# Patient Record
Sex: MALE | Race: WHITE | NOT HISPANIC OR LATINO | Employment: FULL TIME | URBAN - METROPOLITAN AREA
[De-identification: names, ages, dates, MRNs, and addresses within clinical notes are randomized per-mention and may not be internally consistent; named-entity substitution may affect disease eponyms.]

---

## 2018-04-02 ENCOUNTER — TELEPHONE (OUTPATIENT)
Dept: GASTROENTEROLOGY | Facility: CLINIC | Age: 58
End: 2018-04-02

## 2021-11-23 ENCOUNTER — OFFICE VISIT (OUTPATIENT)
Dept: FAMILY MEDICINE CLINIC | Facility: CLINIC | Age: 61
End: 2021-11-23
Payer: COMMERCIAL

## 2021-11-23 VITALS
DIASTOLIC BLOOD PRESSURE: 78 MMHG | HEIGHT: 67 IN | SYSTOLIC BLOOD PRESSURE: 142 MMHG | BODY MASS INDEX: 27.78 KG/M2 | HEART RATE: 74 BPM | OXYGEN SATURATION: 98 % | WEIGHT: 177 LBS | TEMPERATURE: 98.2 F

## 2021-11-23 DIAGNOSIS — Z13.21 ENCOUNTER FOR VITAMIN DEFICIENCY SCREENING: ICD-10-CM

## 2021-11-23 DIAGNOSIS — Z11.59 NEED FOR HEPATITIS C SCREENING TEST: ICD-10-CM

## 2021-11-23 DIAGNOSIS — Z13.0 SCREENING FOR DEFICIENCY ANEMIA: ICD-10-CM

## 2021-11-23 DIAGNOSIS — Z13.1 ENCOUNTER FOR SCREENING FOR DIABETES MELLITUS: ICD-10-CM

## 2021-11-23 DIAGNOSIS — Z11.4 SCREENING FOR HIV (HUMAN IMMUNODEFICIENCY VIRUS): ICD-10-CM

## 2021-11-23 DIAGNOSIS — Z76.89 ESTABLISHING CARE WITH NEW DOCTOR, ENCOUNTER FOR: ICD-10-CM

## 2021-11-23 DIAGNOSIS — Z28.21 INFLUENZA VACCINE REFUSED: ICD-10-CM

## 2021-11-23 DIAGNOSIS — Z13.6 ENCOUNTER FOR SCREENING FOR CARDIOVASCULAR DISORDERS: ICD-10-CM

## 2021-11-23 DIAGNOSIS — E03.9 ACQUIRED HYPOTHYROIDISM: Primary | ICD-10-CM

## 2021-11-23 PROCEDURE — 99204 OFFICE O/P NEW MOD 45 MIN: CPT | Performed by: NURSE PRACTITIONER

## 2021-11-23 RX ORDER — LEVOTHYROXINE SODIUM 0.03 MG/1
25 TABLET ORAL DAILY
COMMUNITY

## 2021-11-23 RX ORDER — CLOBETASOL PROPIONATE 0.5 MG/G
OINTMENT TOPICAL
COMMUNITY
Start: 2021-11-05 | End: 2021-11-23 | Stop reason: ALTCHOICE

## 2021-11-24 ENCOUNTER — TELEPHONE (OUTPATIENT)
Dept: ADMINISTRATIVE | Facility: OTHER | Age: 61
End: 2021-11-24

## 2024-11-25 ENCOUNTER — OFFICE VISIT (OUTPATIENT)
Dept: FAMILY MEDICINE CLINIC | Facility: CLINIC | Age: 64
End: 2024-11-25
Payer: COMMERCIAL

## 2024-11-25 ENCOUNTER — APPOINTMENT (OUTPATIENT)
Age: 64
End: 2024-11-25
Payer: COMMERCIAL

## 2024-11-25 VITALS
TEMPERATURE: 98.9 F | HEIGHT: 71 IN | OXYGEN SATURATION: 98 % | HEART RATE: 102 BPM | SYSTOLIC BLOOD PRESSURE: 138 MMHG | DIASTOLIC BLOOD PRESSURE: 88 MMHG | WEIGHT: 173.4 LBS | BODY MASS INDEX: 24.27 KG/M2

## 2024-11-25 DIAGNOSIS — Z13.220 ENCOUNTER FOR SCREENING FOR LIPID DISORDER: ICD-10-CM

## 2024-11-25 DIAGNOSIS — Z00.00 ANNUAL PHYSICAL EXAM: ICD-10-CM

## 2024-11-25 DIAGNOSIS — E03.9 ACQUIRED HYPOTHYROIDISM: Primary | ICD-10-CM

## 2024-11-25 DIAGNOSIS — Z13.1 SCREENING FOR DIABETES MELLITUS: ICD-10-CM

## 2024-11-25 DIAGNOSIS — E03.9 ACQUIRED HYPOTHYROIDISM: ICD-10-CM

## 2024-11-25 DIAGNOSIS — Z12.5 SCREENING FOR PROSTATE CANCER: ICD-10-CM

## 2024-11-25 DIAGNOSIS — Z12.11 SCREENING FOR COLON CANCER: ICD-10-CM

## 2024-11-25 LAB
ALBUMIN SERPL BCG-MCNC: 4.8 G/DL (ref 3.5–5)
ALP SERPL-CCNC: 42 U/L (ref 34–104)
ALT SERPL W P-5'-P-CCNC: 31 U/L (ref 7–52)
ANION GAP SERPL CALCULATED.3IONS-SCNC: 8 MMOL/L (ref 4–13)
AST SERPL W P-5'-P-CCNC: 31 U/L (ref 13–39)
BILIRUB SERPL-MCNC: 0.72 MG/DL (ref 0.2–1)
BILIRUB UR QL STRIP: NEGATIVE
BUN SERPL-MCNC: 14 MG/DL (ref 5–25)
CALCIUM SERPL-MCNC: 9.7 MG/DL (ref 8.4–10.2)
CHLORIDE SERPL-SCNC: 99 MMOL/L (ref 96–108)
CHOLEST SERPL-MCNC: 180 MG/DL (ref ?–200)
CLARITY UR: CLEAR
CO2 SERPL-SCNC: 28 MMOL/L (ref 21–32)
COLOR UR: ABNORMAL
CREAT SERPL-MCNC: 0.83 MG/DL (ref 0.6–1.3)
ERYTHROCYTE [DISTWIDTH] IN BLOOD BY AUTOMATED COUNT: 13.1 % (ref 11.6–15.1)
GFR SERPL CREATININE-BSD FRML MDRD: 94 ML/MIN/1.73SQ M
GLUCOSE P FAST SERPL-MCNC: 76 MG/DL (ref 65–99)
GLUCOSE UR STRIP-MCNC: NEGATIVE MG/DL
HCT VFR BLD AUTO: 45.4 % (ref 36.5–49.3)
HDLC SERPL-MCNC: 74 MG/DL
HGB BLD-MCNC: 15.5 G/DL (ref 12–17)
HGB UR QL STRIP.AUTO: NEGATIVE
KETONES UR STRIP-MCNC: ABNORMAL MG/DL
LDLC SERPL CALC-MCNC: 96 MG/DL (ref 0–100)
LEUKOCYTE ESTERASE UR QL STRIP: NEGATIVE
MCH RBC QN AUTO: 31.9 PG (ref 26.8–34.3)
MCHC RBC AUTO-ENTMCNC: 34.1 G/DL (ref 31.4–37.4)
MCV RBC AUTO: 93 FL (ref 82–98)
NITRITE UR QL STRIP: NEGATIVE
PH UR STRIP.AUTO: 6 [PH]
PLATELET # BLD AUTO: 239 THOUSANDS/UL (ref 149–390)
PMV BLD AUTO: 10.8 FL (ref 8.9–12.7)
POTASSIUM SERPL-SCNC: 3.9 MMOL/L (ref 3.5–5.3)
PROT SERPL-MCNC: 8.2 G/DL (ref 6.4–8.4)
PROT UR STRIP-MCNC: NEGATIVE MG/DL
PSA SERPL-MCNC: 2.37 NG/ML (ref 0–4)
RBC # BLD AUTO: 4.86 MILLION/UL (ref 3.88–5.62)
SODIUM SERPL-SCNC: 135 MMOL/L (ref 135–147)
SP GR UR STRIP.AUTO: 1.02 (ref 1–1.03)
TRIGL SERPL-MCNC: 51 MG/DL (ref ?–150)
TSH SERPL DL<=0.05 MIU/L-ACNC: 44.23 UIU/ML (ref 0.45–4.5)
UROBILINOGEN UR STRIP-ACNC: <2 MG/DL
WBC # BLD AUTO: 6.74 THOUSAND/UL (ref 4.31–10.16)

## 2024-11-25 PROCEDURE — 84443 ASSAY THYROID STIM HORMONE: CPT

## 2024-11-25 PROCEDURE — 85027 COMPLETE CBC AUTOMATED: CPT

## 2024-11-25 PROCEDURE — 81003 URINALYSIS AUTO W/O SCOPE: CPT

## 2024-11-25 PROCEDURE — 80061 LIPID PANEL: CPT

## 2024-11-25 PROCEDURE — 36415 COLL VENOUS BLD VENIPUNCTURE: CPT

## 2024-11-25 PROCEDURE — 99213 OFFICE O/P EST LOW 20 MIN: CPT | Performed by: FAMILY MEDICINE

## 2024-11-25 PROCEDURE — 86376 MICROSOMAL ANTIBODY EACH: CPT

## 2024-11-25 PROCEDURE — G0103 PSA SCREENING: HCPCS

## 2024-11-25 PROCEDURE — 84439 ASSAY OF FREE THYROXINE: CPT

## 2024-11-25 PROCEDURE — 99396 PREV VISIT EST AGE 40-64: CPT | Performed by: FAMILY MEDICINE

## 2024-11-25 PROCEDURE — 80053 COMPREHEN METABOLIC PANEL: CPT

## 2024-11-25 NOTE — PROGRESS NOTES
Adult Annual Physical  Name: Huseyin Mckay      : 1960      MRN: [id number]  Encounter Provider: ZACH Spencer  Encounter Date: 2024   Encounter department: [facility] [address]    Assessment & Plan  Acquired hypothyroidism  Presently not treating, discussed previous TSH, denies any symptomology, would recommend reeval TSH and address after labs  Orders:    TSH + Free T4; Future    Anti-microsomal antibody; Future    Screening for prostate cancer    Orders:    PSA, Total Screen; Future    Encounter for screening for lipid disorder    Orders:    Lipid Panel with Direct LDL reflex; Future    Screening for diabetes mellitus    Orders:    Comprehensive metabolic panel; Future    Annual physical exam    Orders:    CBC; Future    UA w Reflex to Microscopic w Reflex to Culture; Future    Screening for colon cancer    Orders:    Ambulatory referral to Gastroenterology; Future    Immunizations and preventive care screenings were discussed with patient today. Appropriate education was printed on patient's after visit summary.        Counseling:  Alcohol/drug use: discussed moderation in alcohol intake, the recommendations for healthy alcohol use, and avoidance of illicit drug use.  Dental Health: discussed importance of regular tooth brushing, flossing, and dental visits.  Injury prevention: discussed safety/seat belts, safety helmets, smoke detectors, carbon monoxide detectors, and smoking near bedding or upholstery.  Exercise: the importance of regular exercise/physical activity was discussed. Recommend exercise 3-5 times per week for at least 30 minutes.       Depression Screening and Follow-up Plan: Patient was screened for depression during today's encounter. They screened negative with a PHQ-2 score of 0.    Tobacco Cessation Counseling: Tobacco cessation counseling was provided. The patient is sincerely urged to quit consumption of tobacco. He is ready to  "quit tobacco.         History of Present Illness     Adult Annual Physical:  Patient presents for annual physical.     Diet and Physical Activity:  - Diet/Nutrition: well balanced diet.  - Exercise: no formal exercise.    Depression Screening:  - PHQ-2 Score: 0    General Health:  - Sleep: sleeps well.  - Hearing: normal hearing bilateral ears.  - Vision: no vision problems and goes for regular eye exams.  - Dental: regular dental visits.     Health:    - Urinary symptoms: none.     Review of Systems   Constitutional:  Negative for appetite change, chills, fever and unexpected weight change.   HENT:  Negative for congestion, dental problem, ear pain, hearing loss, postnasal drip, rhinorrhea, sinus pressure, sinus pain, sneezing, sore throat, tinnitus and voice change.    Eyes:  Negative for visual disturbance.   Respiratory:  Negative for apnea, cough, chest tightness and shortness of breath.    Cardiovascular:  Negative for chest pain, palpitations and leg swelling.   Gastrointestinal:  Negative for abdominal pain, blood in stool, constipation, diarrhea, nausea and vomiting.   Endocrine: Negative for cold intolerance, heat intolerance, polydipsia, polyphagia and polyuria.   Genitourinary:  Negative for decreased urine volume, difficulty urinating, dysuria, frequency and hematuria.   Musculoskeletal:  Negative for arthralgias, back pain, gait problem, joint swelling and myalgias.   Skin:  Negative for color change, rash and wound.   Allergic/Immunologic: Negative for environmental allergies and food allergies.   Neurological:  Negative for dizziness, syncope, weakness, light-headedness, numbness and headaches.   Hematological:  Negative for adenopathy. Does not bruise/bleed easily.   Psychiatric/Behavioral:  Negative for sleep disturbance and suicidal ideas. The patient is not nervous/anxious.          Objective   /88   Pulse 102   Temp 98.9 °F (37.2 °C)   Ht 5' 11\" (1.803 m)   Wt 78.7 kg (173 lb 6.4 oz) "   SpO2 98%   BMI 24.18 kg/m²     Physical Exam  Constitutional:       General: He is not in acute distress.     Appearance: He is well-developed. He is not ill-appearing or toxic-appearing.   HENT:      Head: Normocephalic and atraumatic.   Eyes:      Conjunctiva/sclera: Conjunctivae normal.   Cardiovascular:      Rate and Rhythm: Normal rate and regular rhythm.      Heart sounds: Normal heart sounds.   Pulmonary:      Effort: Pulmonary effort is normal.      Breath sounds: Normal breath sounds.   Musculoskeletal:         General: Normal range of motion.      Cervical back: Normal range of motion and neck supple.   Skin:     General: Skin is warm and dry.      Findings: No lesion or rash.   Neurological:      Mental Status: He is alert and oriented to person, place, and time.      Deep Tendon Reflexes: Reflexes are normal and symmetric.   Psychiatric:         Behavior: Behavior normal.         Thought Content: Thought content normal.         Judgment: Judgment normal.

## 2024-11-25 NOTE — ASSESSMENT & PLAN NOTE
Presently not treating, discussed previous TSH, denies any symptomology, would recommend reeval TSH and address after labs  Orders:    TSH + Free T4; Future    Anti-microsomal antibody; Future

## 2024-11-26 LAB — T4 FREE SERPL-MCNC: 0.32 NG/DL (ref 0.61–1.12)

## 2024-11-27 LAB — THYROPEROXIDASE AB SERPL-ACNC: 112 IU/ML (ref 0–34)

## 2024-12-03 ENCOUNTER — TELEPHONE (OUTPATIENT)
Age: 64
End: 2024-12-03

## 2024-12-03 DIAGNOSIS — E03.9 ACQUIRED HYPOTHYROIDISM: Primary | ICD-10-CM

## 2024-12-03 RX ORDER — LEVOTHYROXINE SODIUM 25 UG/1
25 TABLET ORAL DAILY
Qty: 90 TABLET | Refills: 0 | Status: SHIPPED | OUTPATIENT
Start: 2024-12-03

## 2024-12-03 NOTE — TELEPHONE ENCOUNTER
Spoke with patient wife and she is aware, pharmacy was added to the chart as well to send the script

## 2024-12-03 NOTE — TELEPHONE ENCOUNTER
Please inform patient that thyroid level is extremely high.  It is recommended that he gets restarted on his levothyroxine.  Start him on levothyroxine 25 mcg, but there is no pharmacy in his chart.  Can this be updated so his medication could be started.  Once he started on levothyroxine, he is to repeat thyroid studies in 6 to 8 weeks.

## 2024-12-03 NOTE — TELEPHONE ENCOUNTER
Patients wife, Naida, called regarding blood work results.      Once read by the doctor, can someone give patient or Naida a call with the results.    Please advise and notify.    Thank you.    Huseyin- [phone]  Naida- [phone]

## 2024-12-04 ENCOUNTER — RESULTS FOLLOW-UP (OUTPATIENT)
Dept: FAMILY MEDICINE CLINIC | Facility: CLINIC | Age: 64
End: 2024-12-04

## 2025-02-13 ENCOUNTER — APPOINTMENT (OUTPATIENT)
Age: 65
End: 2025-02-13
Payer: COMMERCIAL

## 2025-02-13 ENCOUNTER — RESULTS FOLLOW-UP (OUTPATIENT)
Dept: FAMILY MEDICINE CLINIC | Facility: CLINIC | Age: 65
End: 2025-02-13

## 2025-02-13 DIAGNOSIS — E03.9 ACQUIRED HYPOTHYROIDISM: ICD-10-CM

## 2025-02-13 DIAGNOSIS — E03.9 ACQUIRED HYPOTHYROIDISM: Primary | ICD-10-CM

## 2025-02-13 LAB
T4 FREE SERPL-MCNC: 0.46 NG/DL (ref 0.61–1.12)
TSH SERPL DL<=0.05 MIU/L-ACNC: 45.53 UIU/ML (ref 0.45–4.5)

## 2025-02-13 PROCEDURE — 84443 ASSAY THYROID STIM HORMONE: CPT

## 2025-02-13 PROCEDURE — 84439 ASSAY OF FREE THYROXINE: CPT

## 2025-02-13 PROCEDURE — 36415 COLL VENOUS BLD VENIPUNCTURE: CPT

## 2025-02-13 RX ORDER — LEVOTHYROXINE SODIUM 50 UG/1
50 TABLET ORAL
Qty: 100 TABLET | Refills: 1 | Status: SHIPPED | OUTPATIENT
Start: 2025-02-13

## 2025-03-14 ENCOUNTER — PREP FOR PROCEDURE (OUTPATIENT)
Dept: GASTROENTEROLOGY | Facility: CLINIC | Age: 65
End: 2025-03-14

## 2025-03-14 DIAGNOSIS — Z86.0100 HISTORY OF COLON POLYPS: Primary | ICD-10-CM

## 2025-03-24 ENCOUNTER — OFFICE VISIT (OUTPATIENT)
Age: 65
End: 2025-03-24
Payer: COMMERCIAL

## 2025-03-24 VITALS — BODY MASS INDEX: 24.92 KG/M2 | HEIGHT: 71 IN | WEIGHT: 178 LBS | TEMPERATURE: 98.8 F

## 2025-03-24 DIAGNOSIS — L57.0 KERATOSIS, ACTINIC: Primary | ICD-10-CM

## 2025-03-24 DIAGNOSIS — L82.1 SEBORRHEIC KERATOSES: ICD-10-CM

## 2025-03-24 PROCEDURE — 17000 DESTRUCT PREMALG LESION: CPT | Performed by: DERMATOLOGY

## 2025-03-24 PROCEDURE — 99203 OFFICE O/P NEW LOW 30 MIN: CPT | Performed by: DERMATOLOGY

## 2025-03-24 NOTE — PROGRESS NOTES
"[facility] Dermatology Clinic Note     Patient Name: Huseyin Mckay  Encounter Date: 3/24/25     Have you been cared for by a [facility] Dermatologist in the last 3 years and, if so, which description applies to you?    NO.   I am considered a \"new\" patient and must complete all patient intake questions. I am MALE/not capable of bearing children.    REVIEW OF SYSTEMS:  Have you recently had or currently have any of the following? Recent fever or chills? No  Any non-healing wound? No   PAST MEDICAL HISTORY:  Have you personally ever had or currently have any of the following?  If \"YES,\" then please provide more detail. Skin cancer (such as Melanoma, Basal Cell Carcinoma, Squamous Cell Carcinoma?  No  Tuberculosis, HIV/AIDS, Hepatitis B or C: No  Radiation Treatment No   HISTORY OF IMMUNOSUPPRESSION:   Do you have a history of any of the following:  Systemic Immunosuppression such as Diabetes, Biologic or Immunotherapy, Chemotherapy, Organ Transplantation, Bone Marrow Transplantation or Prednisone?  No     Answering \"YES\" requires the addition of the dotphrase \"IMMUNOSUPPRESSED\" as the first diagnosis of the patient's visit.   FAMILY HISTORY:  Any \"first degree relatives\" (parent, brother, sister, or child) with the following?    Skin Cancer, Pancreatic or Other Cancer? YES, Sister skin cancer type unknown to patient   PATIENT EXPERIENCE:    Do you want the Dermatologist to perform a COMPLETE skin exam today including a clinical examination under the \"bra and underwear\" areas?  NO patient decline FBSE  If necessary, do we have your permission to call and leave a detailed message on your Preferred Phone number that includes your specific medical information?  NO      No Known Allergies   Current Outpatient Medications:   •  levothyroxine 50 mcg tablet, Take 1 tablet (50 mcg total) by mouth daily in the early morning, Disp: 100 tablet, Rfl: 1          Whom besides the patient is providing clinical information about " "today's encounter?   NO ADDITIONAL HISTORIAN (patient alone provided history)    Physical Exam and Assessment/Plan by Diagnosis:    Patient is here for 2 SOC on the Right temple and back. Patient stated none of the SOC has had any symptoms. Sister with history of skin cancer.    ACTINIC KERATOSIS    Physical Exam:  Anatomic Location Affected:  right temple  Morphological Description:  Scaly pink papules  Pertinent Positives:  Pertinent Negatives:      Assessment and Plan:  Based on a thorough discussion of this condition and the management approach to it (including a comprehensive discussion of the known risks, side effects and potential benefits of treatment), the patient (family) agrees to implement the following specific plan:  When outside we recommend using a wide brim hat, sunglasses, long sleeve and pants, sunscreen with SPF 30+ with reapplication every 2 hours, or SPF specific clothing   liquid nitrogen to treat areas. Consent obtained. Expect area to blister, crust, and then fall off within 2 weeks. Please use vaseline.                                         PROCEDURE:  DESTRUCTION OF PRE-MALIGNANT LESIONS  After a thorough discussion of treatment options and risk/benefits/alternatives (including but not limited to local pain, scarring, dyspigmentation, blistering, and possible superinfection), verbal and written consent were obtained and the aforementioned lesions were treated on with cryotherapy using liquid nitrogen x 1 cycle for 5-10 seconds.    TOTAL NUMBER of 1 pre-malignant lesions were treated today on the ANATOMIC LOCATION: Right temple.     The patient tolerated the procedure well, and after-care instructions were provided.    SEBORRHEIC KERATOSIS; NON-INFLAMED    Physical Exam:  Anatomic Location Affected:  right lower back  Morphological Description:  Flat and raised, waxy, smooth to warty textured, yellow to brownish-grey to dark brown to blackish, discrete, \"stuck-on\" appearing " "papules.  Pertinent Positives:  Pertinent Negatives:    Additional History of Present Condition:  Patient reports new bumps on the skin.  Denies itch, burn, pain, bleeding or ulceration.  Present constantly; nothing seems to make it worse or better.  No prior treatment.      Assessment and Plan:  Based on a thorough discussion of this condition and the management approach to it (including a comprehensive discussion of the known risks, side effects and potential benefits of treatment), the patient (family) agrees to implement the following specific plan:  Reassured benign    Seborrheic Keratosis  A seborrheic keratosis is a harmless warty spot that appears during adult life as a common sign of skin aging.  Seborrheic keratoses can arise on any area of skin, covered or uncovered, with the usual exception of the palms and soles. They do not arise from mucous membranes. Seborrheic keratoses can have highly variable appearance.      Seborrheic keratoses are extremely common. It has been estimated that over 90% of adults over the age of 60 years have one or more of them. They occur in males and females of all races, typically beginning to erupt in the 30s or 40s. They are uncommon under the age of 20 years.  The precise cause of seborrhoeic keratoses is not known.  Seborrhoeic keratoses are considered degenerative in nature. As time goes by, seborrheic keratoses tend to become more numerous. Some people inherit a tendency to develop a very large number of them; some people may have hundreds of them.    The name \"seborrheic keratosis\" is misleading, because these lesions are not limited to a seborrhoeic distribution (scalp, mid-face, chest, upper back), nor are they formed from sebaceous glands, nor are they associated with sebum -- which is greasy.  Seborrheic keratosis may also be called \"SK,\" \"Seb K,\" \"basal cell papilloma,\" \"senile wart,\" or \"barnacle.\"      Researchers have noted:  Eruptive seborrhoeic keratoses can " "follow sunburn or dermatitis  Skin friction may be the reason they appear in body folds  Viral cause (e.g., human papillomavirus) seems unlikely  Stable and clonal mutations or activation of FRFR3, PIK3CA, KD, AKT1 and EGFR genes are found in seborrhoeic keratoses  Seborrhoeic keratosis can arise from solar lentigo  FRFR3 mutations also arise in solar lentigines. These mutations are associated with increased age and location on the head and neck, suggesting a role of ultraviolet radiation in these lesions  Seborrheic keratoses do not harbour tumour suppressor gene mutations  Epidermal growth factor receptor inhibitors, which are used to treat some cancers, often result in an increase in verrucal (warty) keratoses.    There is no easy way to remove multiple lesions on a single occasion.  Unless a specific lesion is \"inflamed\" and is causing pain or stinging/burning or is bleeding, most insurance companies do not authorize treatment.       Scribe Attestation    I,:  Aileen Pagan MA am acting as a scribe while in the presence of the attending physician.:       I,:  Leeann Escamilla MD personally performed the services described in this documentation    as scribed in my presence.:          "

## 2025-04-02 ENCOUNTER — HOSPITAL ENCOUNTER (OUTPATIENT)
Dept: GASTROENTEROLOGY | Facility: HOSPITAL | Age: 65
Setting detail: OUTPATIENT SURGERY
Discharge: HOME | End: 2025-04-02
Attending: INTERNAL MEDICINE
Payer: COMMERCIAL

## 2025-04-02 ENCOUNTER — ANESTHESIA EVENT (OUTPATIENT)
Dept: GASTROENTEROLOGY | Facility: HOSPITAL | Age: 65
End: 2025-04-02
Payer: COMMERCIAL

## 2025-04-02 ENCOUNTER — ANESTHESIA (OUTPATIENT)
Dept: GASTROENTEROLOGY | Facility: HOSPITAL | Age: 65
End: 2025-04-02
Payer: COMMERCIAL

## 2025-04-02 VITALS
OXYGEN SATURATION: 98 % | WEIGHT: 166.23 LBS | HEIGHT: 71 IN | HEART RATE: 67 BPM | RESPIRATION RATE: 18 BRPM | BODY MASS INDEX: 23.27 KG/M2 | SYSTOLIC BLOOD PRESSURE: 160 MMHG | TEMPERATURE: 97.5 F | DIASTOLIC BLOOD PRESSURE: 96 MMHG

## 2025-04-02 DIAGNOSIS — Z12.11 COLON CANCER SCREENING: ICD-10-CM

## 2025-04-02 DIAGNOSIS — Z86.0100 HISTORY OF COLON POLYPS: ICD-10-CM

## 2025-04-02 PROBLEM — F17.200 CURRENT SMOKER ON SOME DAYS: Status: ACTIVE | Noted: 2025-04-02

## 2025-04-02 PROCEDURE — 45385 COLONOSCOPY W/LESION REMOVAL: CPT | Performed by: INTERNAL MEDICINE

## 2025-04-02 PROCEDURE — 88305 TISSUE EXAM BY PATHOLOGIST: CPT | Performed by: PATHOLOGY

## 2025-04-02 RX ORDER — LIDOCAINE HYDROCHLORIDE 20 MG/ML
INJECTION, SOLUTION EPIDURAL; INFILTRATION; INTRACAUDAL; PERINEURAL AS NEEDED
Status: DISCONTINUED | OUTPATIENT
Start: 2025-04-02 | End: 2025-04-02

## 2025-04-02 RX ORDER — PROPOFOL 10 MG/ML
INJECTION, EMULSION INTRAVENOUS AS NEEDED
Status: DISCONTINUED | OUTPATIENT
Start: 2025-04-02 | End: 2025-04-02

## 2025-04-02 RX ADMIN — PROPOFOL 30 MG: 10 INJECTION, EMULSION INTRAVENOUS at 08:35

## 2025-04-02 RX ADMIN — PROPOFOL 50 MG: 10 INJECTION, EMULSION INTRAVENOUS at 08:28

## 2025-04-02 RX ADMIN — LIDOCAINE HYDROCHLORIDE 100 MG: 20 INJECTION, SOLUTION EPIDURAL; INFILTRATION; INTRACAUDAL; PERINEURAL at 08:23

## 2025-04-02 RX ADMIN — PROPOFOL 140 MG: 10 INJECTION, EMULSION INTRAVENOUS at 08:23

## 2025-04-02 RX ADMIN — PROPOFOL 50 MG: 10 INJECTION, EMULSION INTRAVENOUS at 08:25

## 2025-04-02 RX ADMIN — PROPOFOL 30 MG: 10 INJECTION, EMULSION INTRAVENOUS at 08:31

## 2025-04-02 NOTE — ANESTHESIA PREPROCEDURE EVALUATION
"Procedure:  COLONOSCOPY    Relevant Problems   ENDO   (+) Acquired hypothyroidism      Behavioral Health   (+) Current smoker on some days        Physical Exam    Airway    Mallampati score: II  TM Distance: >3 FB  Neck ROM: full     Dental   Comment: Patient elects not to remove upper dentures, counseled on reasons to remove     Cardiovascular      Pulmonary      Other Findings        Anesthesia Plan  ASA Score- 2     Anesthesia Type- IV sedation with anesthesia with ASA Monitors.         Additional Monitors:     Airway Plan:     Comment: Recent labs personally reviewed:  Lab Results       Component                Value               Date                       WBC                      6.74                11/25/2024                 HGB                      15.5                11/25/2024                 PLT                      239                 11/25/2024            Lab Results       Component                Value               Date                       K                        3.9                 11/25/2024                 BUN                      14                  11/25/2024                 CREATININE               0.83                11/25/2024            No results found for: \"PTT\"   No results found for: \"INR\"    Blood type     Patient was consented for sedation with IV anesthetic. Discussed that we will maintain spontaneous respirations and utilize supplemental O2. I discussed the risks of aspiration, hypoxia, laryngospasm and bronchospasm. I discussed the scenarios related to conversion to general anesthetic. All questions answered.     I, No Crow MD, have personally seen and evaluated the patient prior to anesthetic care.  I have reviewed the pre-anesthetic record, medical history, allergies, medications and any other medical records if appropriate to the anesthetic care.  If a CRNA is involved in the case, I have reviewed the CRNA assessment, if present, and agree. Patient consented for IV " Sedation, general anesthesia as back up. Discussed risks of aspiration, IV infiltration, indications for conversion to general anesthesia. All questions and concerns addressed.   .       Plan Factors-Exercise tolerance (METS): >4 METS.    Chart reviewed.   Existing labs reviewed. Patient summary reviewed.    Patient is a current smoker.  Patient instructed to abstain from smoking on day of procedure. Patient did not smoke on day of surgery.    Obstructive sleep apnea risk education given perioperatively.        Induction- intravenous.    Postoperative Plan-         Informed Consent- Anesthetic plan and risks discussed with patient.  I personally reviewed this patient with the CRNA. Discussed and agreed on the Anesthesia Plan with the CRNA..      NPO Status:  No vitals data found for the desired time range.

## 2025-04-02 NOTE — H&P
"History and Physical -  [facility]  Huseyin Mckay 65 y.o. male MRN: [id number]                  HPI: Huseyin Mckay is a 65 y.o. year old male who presents for   Colonoscopy for history of colon polyp    REVIEW OF SYSTEMS: Per the HPI, and otherwise unremarkable.    Historical Information   Past Medical History:   Diagnosis Date    Disease of thyroid gland     Hernia, inguinal     Torn meniscus     rt knee x 3     Past Surgical History:   Procedure Laterality Date    HERNIA REPAIR      KNEE SURGERY       Social History   Social History     Substance and Sexual Activity   Alcohol Use Yes    Comment: on weekends     Social History     Substance and Sexual Activity   Drug Use Never     Social History     Tobacco Use   Smoking Status Some Days    Types: Cigars   Smokeless Tobacco Never     Family History   Problem Relation Age of Onset    No Known Problems Mother     Hypertension Father     Heart disease Father     Diabetes Brother     No Known Problems Son     No Known Problems Daughter     Diabetes Brother        Meds/Allergies     Not in a hospital admission.    No Known Allergies    Objective     Blood pressure (!) 178/99, pulse 83, temperature (!) 97.1 °F (36.2 °C), temperature source Temporal, resp. rate 15, height 5' 11\" (1.803 m), weight 75.4 kg (166 lb 3.6 oz), SpO2 99%.      PHYSICAL EXAM    BP (!) 178/99   Pulse 83   Temp (!) 97.1 °F (36.2 °C) (Temporal)   Resp 15   Ht 5' 11\" (1.803 m)   Wt 75.4 kg (166 lb 3.6 oz)   SpO2 99%   BMI 23.18 kg/m²       Gen: NAD  CV: RRR  CHEST: Clear  ABD: soft, NT/ND  EXT: no edema      ASSESSMENT/PLAN:  This is a 65 y.o. year old male here for coloolnoscopy, and he is stable and optimized for his procedure.        "

## 2025-04-02 NOTE — ANESTHESIA POSTPROCEDURE EVALUATION
Post-Op Assessment Note    CV Status:  Stable    Pain management: adequate       Mental Status:  Alert and awake   Hydration Status:  Euvolemic   PONV Controlled:  Controlled   Airway Patency:  Patent  Two or more mitigation strategies used for obstructive sleep apnea   Post Op Vitals Reviewed: Yes    No anethesia notable event occurred.    Staff: CRNA, Anesthesiologist           Last Filed PACU Vitals:  Vitals Value Taken Time   Temp     Pulse     BP     Resp     SpO2         Modified May:     Vitals Value Taken Time   Activity 2 04/02/25 0839   Respiration 2 04/02/25 0839   Circulation 2 04/02/25 0839   Consciousness 2 04/02/25 0839   Oxygen Saturation 2 04/02/25 0839     Modified May Score: 10

## 2025-04-08 ENCOUNTER — RESULTS FOLLOW-UP (OUTPATIENT)
Dept: GASTROENTEROLOGY | Facility: CLINIC | Age: 65
End: 2025-04-08

## 2025-04-08 NOTE — TELEPHONE ENCOUNTER
----- Message from Man Covarrubias MD sent at 4/8/2025  7:35 AM EDT -----  Please tell Huseyin the polyps were precancerous and will be due for colonoscopy in 3 years and we will call him then

## 2025-04-14 ENCOUNTER — TELEPHONE (OUTPATIENT)
Age: 65
End: 2025-04-14

## 2025-05-17 ENCOUNTER — OFFICE VISIT (OUTPATIENT)
Age: 65
End: 2025-05-17
Payer: COMMERCIAL

## 2025-05-17 ENCOUNTER — APPOINTMENT (OUTPATIENT)
Age: 65
End: 2025-05-17
Attending: PHYSICIAN ASSISTANT
Payer: COMMERCIAL

## 2025-05-17 VITALS
TEMPERATURE: 99 F | HEART RATE: 129 BPM | DIASTOLIC BLOOD PRESSURE: 75 MMHG | BODY MASS INDEX: 24.21 KG/M2 | OXYGEN SATURATION: 96 % | RESPIRATION RATE: 18 BRPM | SYSTOLIC BLOOD PRESSURE: 151 MMHG | WEIGHT: 173.6 LBS

## 2025-05-17 DIAGNOSIS — R09.89 CHEST CONGESTION: ICD-10-CM

## 2025-05-17 DIAGNOSIS — R91.8 INFILTRATE OF LUNG PRESENT ON CHEST X-RAY: Primary | ICD-10-CM

## 2025-05-17 DIAGNOSIS — R53.83 OTHER FATIGUE: ICD-10-CM

## 2025-05-17 PROCEDURE — G0382 LEV 3 HOSP TYPE B ED VISIT: HCPCS | Performed by: PHYSICIAN ASSISTANT

## 2025-05-17 PROCEDURE — 71046 X-RAY EXAM CHEST 2 VIEWS: CPT

## 2025-05-17 RX ORDER — BENZONATATE 200 MG/1
200 CAPSULE ORAL 3 TIMES DAILY PRN
Qty: 20 CAPSULE | Refills: 0 | Status: SHIPPED | OUTPATIENT
Start: 2025-05-17

## 2025-05-17 RX ORDER — AZITHROMYCIN 250 MG/1
TABLET, FILM COATED ORAL
Qty: 6 TABLET | Refills: 0 | Status: SHIPPED | OUTPATIENT
Start: 2025-05-17 | End: 2025-05-21

## 2025-05-17 NOTE — PROGRESS NOTES
[facility]        NAME: Huseyin Mckay is a 65 y.o. male  : 1960    MRN: [id number]  DATE: May 17, 2025  TIME: 9:15 AM    Assessment and Plan   Infiltrate of lung present on chest x-ray [R91.8]  1. Infiltrate of lung present on chest x-ray  azithromycin (ZITHROMAX) 250 mg tablet    benzonatate (TESSALON) 200 MG capsule    dextromethorphan-guaifenesin (MUCINEX DM)  MG per 12 hr tablet      2. Other fatigue  XR chest pa and lateral      3. Chest congestion  XR chest pa and lateral    benzonatate (TESSALON) 200 MG capsule    dextromethorphan-guaifenesin (MUCINEX DM)  MG per 12 hr tablet            Patient Instructions     Preliminary x-rays of your chest reveals infiltrates.  Official report is pending.    Azithromycin as directed  Tessalon Perles every 8 hours as needed for cough  Mucinex DM 1 tablet twice daily for cough  Increase fluid intake and remain well-hydrated    Follow up with PCP in 3-5 days.  Proceed to  ER if symptoms worsen.    If tests are performed, our office will contact you with results only if changes need to made to the care plan discussed with you at the visit. You can review your full results on [facility].    Chief Complaint     Chief Complaint   Patient presents with    Headache     Reports headache x 3 days. Cough productive clear. Chills and sweats . 2 negtive home coivd test taken last one yesterday. Denies Sob taking otc tylenol and nasal spray which is not helping with pain.     Cough         History of Present Illness       Reports headache x 3 days. Cough productive clear. Chills and sweats . 2 negtive home coivd test taken last one yesterday. Denies Sob taking otc tylenol and nasal spray which is not helping with pain.            Review of Systems   Review of Systems   Constitutional:  Positive for chills, diaphoresis and fever. Negative for appetite change and fatigue.   HENT:  Positive for congestion, rhinorrhea, sinus pressure and sinus pain.  Negative for drooling, ear pain, sore throat and trouble swallowing.    Respiratory:  Positive for cough.    Gastrointestinal:  Negative for abdominal pain, diarrhea, nausea and vomiting.   Skin:  Negative for rash.   Neurological:  Positive for headaches. Negative for dizziness, seizures, weakness and light-headedness.         Current Medications     Current Medications[1]    Current Allergies     Allergies as of 05/17/2025    (No Known Allergies)            The following portions of the patient's history were reviewed and updated as appropriate: allergies, current medications, past family history, past medical history, past social history, past surgical history and problem list.     Past Medical History:   Diagnosis Date    Disease of thyroid gland     Hernia, inguinal     Torn meniscus     rt knee x 3       Past Surgical History:   Procedure Laterality Date    HERNIA REPAIR      KNEE SURGERY         Family History   Problem Relation Age of Onset    No Known Problems Mother     Hypertension Father     Heart disease Father     Diabetes Brother     No Known Problems Son     No Known Problems Daughter     Diabetes Brother          Medications have been verified.        Objective   /75 (Patient Position: Sitting, Cuff Size: Adult)   Pulse (!) 129   Temp 99 °F (37.2 °C) (Temporal)   Resp 18   Wt 78.7 kg (173 lb 9.6 oz)   SpO2 96%   BMI 24.21 kg/m²        Physical Exam     Physical Exam  Vitals and nursing note reviewed.   Constitutional:       General: He is not in acute distress.     Appearance: He is normal weight. He is ill-appearing. He is not toxic-appearing or diaphoretic.   HENT:      Right Ear: Tympanic membrane, ear canal and external ear normal.      Left Ear: Tympanic membrane, ear canal and external ear normal.      Nose: Congestion and rhinorrhea present.      Mouth/Throat:      Mouth: Mucous membranes are moist.      Pharynx: Posterior oropharyngeal erythema (Oropharyngeal hyperemia with clear  "postnasal drip.) present. No oropharyngeal exudate.     Eyes:      General: No scleral icterus.     Extraocular Movements: Extraocular movements intact.      Conjunctiva/sclera: Conjunctivae normal.      Pupils: Pupils are equal, round, and reactive to light.       Cardiovascular:      Rate and Rhythm: Normal rate and regular rhythm.      Pulses: Normal pulses.      Heart sounds: Normal heart sounds.   Pulmonary:      Effort: Pulmonary effort is normal. No respiratory distress.      Breath sounds: No stridor. Rhonchi (Right posterior lower lobe.) present. No wheezing or rales.   Chest:      Chest wall: No tenderness.     Musculoskeletal:         General: Normal range of motion.      Cervical back: Normal range of motion and neck supple. No tenderness.   Lymphadenopathy:      Cervical: No cervical adenopathy.     Skin:     General: Skin is warm and dry.      Findings: No rash.     Neurological:      General: No focal deficit present.      Mental Status: He is alert and oriented to person, place, and time.      Coordination: Coordination normal.      Gait: Gait normal.     Psychiatric:         Mood and Affect: Mood normal.         Behavior: Behavior normal.         Thought Content: Thought content normal.         Judgment: Judgment normal.                        [1]   Current Outpatient Medications:     azithromycin (ZITHROMAX) 250 mg tablet, Take 2 tablets today then 1 tablet daily x 4 days, Disp: 6 tablet, Rfl: 0    benzonatate (TESSALON) 200 MG capsule, Take 1 capsule (200 mg total) by mouth 3 (three) times a day as needed for cough, Disp: 20 capsule, Rfl: 0    dextromethorphan-guaifenesin (MUCINEX DM)  MG per 12 hr tablet, Take 1 tablet by mouth every 12 (twelve) hours, Disp: 20 tablet, Rfl: 0    levothyroxine 50 mcg tablet, Take 1 tablet (50 mcg total) by mouth daily in the early morning, Disp: 100 tablet, Rfl: 1    UNKNOWN TO PATIENT, Generic allergy medicine \"without decongestant\", Disp: , Rfl:     "

## 2025-05-17 NOTE — PATIENT INSTRUCTIONS
Patient Education    Preliminary x-rays of your chest reveals infiltrates.  Official report is pending.    Azithromycin as directed  Tessalon Perles every 8 hours as needed for cough  Mucinex DM 1 tablet twice daily for cough  Increase fluid intake and remain well-hydrated    Follow up with PCP in 3-5 days.  Proceed to  ER if symptoms worsen.    If tests are performed, our office will contact you with results only if changes need to made to the care plan discussed with you at the visit. You can review your full results on [facility].     Pneumonia in adults   The Basics   Written by the doctors and editors at [facility]   What is pneumonia? -- Pneumonia is an infection of the lungs that causes coughing, fever, and trouble breathing (figure 1). It is a serious illness, especially in young children, people older than 65, and people with other health problems. Pneumonia can be caused by bacteria, viruses, and other germs.  What are the symptoms of pneumonia? -- Common symptoms include:   Cough   Fever (temperature higher than 100.4°F or 38°C)   Trouble breathing   Pain when taking a deep breath   Fast heartbeat   Shaking chills  When people with pneumonia cough, they often cough up phlegm or mucus.  Should I see a doctor or nurse? -- Yes, if you think that you might have pneumonia, see a doctor or nurse as soon as possible. Pneumonia can be mild. But it can also be very serious, especially if you do not get it treated quickly. See your doctor or nurse right away if:   Your cough keeps getting worse.   You start having trouble breathing when doing everyday tasks or when resting.   You have chest pain when you breathe.   You feel suddenly worse after getting better from a cold or the flu.   You have a weakened immune system, for example because you have an HIV infection, had an organ transplant or stem cell (bone marrow) transplant, or take medicines that suppress the immune system.   You already have a serious  "lung disease, such as chronic obstructive pulmonary disease or emphysema.   You are 65 years of age or older.  If your doctor or nurse thinks that you might have pneumonia, they will probably take an X-ray of your chest. Taking a chest X-ray is the best way to tell if you have pneumonia.  How is pneumonia treated? -- It depends on the cause:   Pneumonia that is caused by bacteria is treated with antibiotics. These medicines kill the germs that cause pneumonia. Most people can take antibiotic pills at home, but some people need to be treated in the hospital. Take all of your antibiotics, even if you feel better before you finish them.   Pneumonia from some viruses, like those that cause the flu or COVID-19, is treated with an \"antiviral\" medicine. For other types of viral pneumonia, there is no specific treatment.  How soon will I feel better? -- You should start to feel better 3 to 5 days after you start taking antibiotics. Most people can go back to their normal routine within a week of starting treatment. Even so, you might feel tired or have a cough for a month or longer after you get treated. Although this cough can take a while to go away, it is usually milder than when you first got sick.  How should I take care of myself until I recover? -- Get lots of rest, and drink lots of fluids.  If you don't need to stay in the hospital, your doctor or nurse might want to see you or talk to you a few days after you begin treatment. This is to make sure that your pneumonia is getting better. They might also want to see you after you get better to make sure that everything is back to normal.  If your symptoms do not improve or get worse after starting treatment, tell your doctor or nurse.  What can I do to keep from getting pneumonia again? -- Wash your hands often with soap and water (figure 2). This will help protect you from germs and help prevent spreading illness.  There is also a vaccine that protects against the " most common type of bacterial pneumonia. But the pneumonia vaccine is not recommended for everyone. Ask your doctor if you should have it. You should get the flu and COVID-19 vaccines every year.  If you smoke, quitting will help prevent pneumonia. Quitting smoking will also improve your overall health.  All topics are updated as new evidence becomes available and our peer review process is complete.  This topic retrieved from [employer] on: Feb 26, 2024.  Topic 35083 Version 25.0  Release: 32.2.4 - C32.56  © 2024 UpToDate, Inc. and/or its affiliates. All rights reserved.  figure 1: Pneumonia     Pneumonia is an infection of the lungs. When you have pneumonia, the air sacs in your lungs become inflamed. They can fill with fluid and cells trying to fight the infection. This can make it hard to breathe.  Graphic 75821 Version 9.0  figure 2: How to wash your hands     Wet your hands with clean water, and apply a small amount of soap. Lather and rub hands together for at least 20 seconds. Clean your wrists, palms, backs of your hands, between your fingers, tips of your fingers, thumbs, and under and around your nails. Rinse well, and dry your hands using a clean towel.  Graphic 657057 Version 7.0  Consumer Information Use and Disclaimer   Disclaimer: This generalized information is a limited summary of diagnosis, treatment, and/or medication information. It is not meant to be comprehensive and should be used as a tool to help the user understand and/or assess potential diagnostic and treatment options. It does NOT include all information about conditions, treatments, medications, side effects, or risks that may apply to a specific patient. It is not intended to be medical advice or a substitute for the medical advice, diagnosis, or treatment of a health care provider based on the health care provider's examination and assessment of a patient's specific and unique circumstances. Patients must speak with a health care  provider for complete information about their health, medical questions, and treatment options, including any risks or benefits regarding use of medications. This information does not endorse any treatments or medications as safe, effective, or approved for treating a specific patient. UpToDate, Inc. and its affiliates disclaim any warranty or liability relating to this information or the use thereof.The use of this information is governed by the Terms of Use, available at https://www.[employer] [facility] [employer] [facility]abia[employer]wer.com/en/know/clinical-effectiveness-terms. 2024© UpToDate, Inc. and its affiliates and/or licensors. All rights reserved.  Copyright   © 2024 UpToDate, Inc. and/or its affiliates. All rights reserved.

## 2025-05-18 ENCOUNTER — RESULTS FOLLOW-UP (OUTPATIENT)
Age: 65
End: 2025-05-18

## 2025-05-18 NOTE — TELEPHONE ENCOUNTER
Called and spoke to the patient's wife.  Explained to her that radiologist recommendation is for chest x-ray in 6 weeks to follow-up on the 1 cm nodule that was seen.  Advised continuing treatment as prescribed.  Advised he can go back to work once 24 hours fever free and symptoms improve over 24 hours.  Advised that masking is recommended for 5 days after that point.  I advised the patient's wife that I will forward this message to the PCP but I recommend that they also reach out to the PCP tomorrow regarding ordering and scheduling the follow-up chest x-ray for 6 weeks.  We also discussed possibility of COVID I did discuss that based on his age and medical history that he would not meet CDC criteria for COVID antivirals therefore any further COVID testing at this point would not  of his illness.  She go to the ER for any worsening symptoms.

## 2025-05-20 ENCOUNTER — OFFICE VISIT (OUTPATIENT)
Dept: FAMILY MEDICINE CLINIC | Facility: CLINIC | Age: 65
End: 2025-05-20
Payer: COMMERCIAL

## 2025-05-20 VITALS
TEMPERATURE: 101.9 F | HEART RATE: 98 BPM | DIASTOLIC BLOOD PRESSURE: 80 MMHG | SYSTOLIC BLOOD PRESSURE: 140 MMHG | WEIGHT: 170.2 LBS | RESPIRATION RATE: 16 BRPM | HEIGHT: 71 IN | BODY MASS INDEX: 23.83 KG/M2 | OXYGEN SATURATION: 98 %

## 2025-05-20 DIAGNOSIS — R05.9 COUGH WITH FEVER: ICD-10-CM

## 2025-05-20 DIAGNOSIS — R93.89 ABNORMAL CHEST X-RAY: ICD-10-CM

## 2025-05-20 DIAGNOSIS — R50.9 COUGH WITH FEVER: ICD-10-CM

## 2025-05-20 DIAGNOSIS — U07.1 COVID-19: Primary | ICD-10-CM

## 2025-05-20 LAB
SARS-COV-2 AG UPPER RESP QL IA: POSITIVE
VALID CONTROL: ABNORMAL

## 2025-05-20 PROCEDURE — 87811 SARS-COV-2 COVID19 W/OPTIC: CPT | Performed by: NURSE PRACTITIONER

## 2025-05-20 PROCEDURE — 99214 OFFICE O/P EST MOD 30 MIN: CPT | Performed by: NURSE PRACTITIONER

## 2025-05-20 RX ORDER — GUAIFENESIN 600 MG/1
1200 TABLET, EXTENDED RELEASE ORAL EVERY 12 HOURS SCHEDULED
Qty: 60 TABLET | Refills: 0 | Status: SHIPPED | OUTPATIENT
Start: 2025-05-20

## 2025-05-20 RX ORDER — METHOCARBAMOL 500 MG/1
500 TABLET, FILM COATED ORAL 3 TIMES DAILY
Qty: 60 TABLET | Refills: 0 | Status: SHIPPED | OUTPATIENT
Start: 2025-05-20

## 2025-05-20 NOTE — LETTER
May 20, 2025    Patient: Huseyin Mckay  YOB: 1960  Date of Last Encounter: 12/3/2024      To whom it may concern:     Huseyin Mckay has tested positive for COVID-19 (Coronavirus). He may return to work on Tuesday 5/27/2025, which is 10 days from illness onset (provided symptoms are improving) and 24 hours without fever.    Sincerely,         ZACH Khan

## 2025-05-20 NOTE — PROGRESS NOTES
Name: Huseyin Mckay      : 1960      MRN: [id number]  Encounter Provider: ZACH Khan  Encounter Date: 2025   Encounter department: [facility] [address]  :  Assessment & Plan  COVID-19  Discussed with patient management of COVID-19 including symptomatic care.  Patient is outside of treatment window, therefore cannot be prescribed oral antivirals.  Educated patient on length of quarantine.  Discussed with patient importance of increased rest, hydration, adequate nutrition.  Advised may start over-the-counter vitamin C, D, zinc.  Mucinex twice daily as needed.  Advised to seek immediate care for development of shortness of breath.     Orders:    methocarbamol (ROBAXIN) 500 mg tablet; Take 1 tablet (500 mg total) by mouth 3 (three) times a day    guaiFENesin (MUCINEX) 600 mg 12 hr tablet; Take 2 tablets (1,200 mg total) by mouth every 12 (twelve) hours    Cough with fever  Rapid COVID test positive in office.  Discussed with patient alternate acetaminophen and ibuprofen.  Encouraged increased hydration, rest.  Will treat symptoms related to COVID-19.  Advised to seek immediate care for development of shortness of breath.    Orders:    POCT Rapid Covid Ag    Abnormal chest x-ray  Chest x-ray from 3 days ago did not see any consolidation.  Showed questionable 1 cm right apical nodule.  Patient is to have repeat chest x-ray in 6 weeks to see if this is a true nodule versus an inflammatory nodule.  Orders placed.    Orders:    XR chest pa and lateral; Future           History of Present Illness   Patient presents office for evaluation of cough and fever.    Symptoms started 6 days ago.  Per patient, had increased fatigue and cough.  Subjective fevers at home.  Took 2 COVID test at home last week, but both were negative.  Seen at Urgent Care 3 days ago on .  COVID test was not completed, but had chest x-ray which showed questionable infiltrate.  He was  "started on Tessalon Perles and azithromycin.  Patient, symptoms have not improved with Z-Nicholas.  Continues with cough.  Denies hemoptysis, shortness of breath, chest tightness.        Review of Systems   Constitutional:  Positive for appetite change, chills, fatigue and fever. Negative for activity change.   HENT:  Positive for congestion. Negative for ear pain, sore throat and trouble swallowing.    Respiratory:  Positive for cough. Negative for chest tightness, shortness of breath and wheezing.    Cardiovascular:  Negative for chest pain and palpitations.   Gastrointestinal:  Negative for abdominal pain, nausea and vomiting.   Genitourinary:  Negative for decreased urine volume.   Musculoskeletal:  Negative for arthralgias and myalgias.   Skin:  Negative for color change and rash.   Neurological:  Positive for headaches. Negative for dizziness, syncope and weakness.   Hematological:  Negative for adenopathy.   Psychiatric/Behavioral:  Negative for dysphoric mood. The patient is not nervous/anxious.        Objective   /80 (BP Location: Left arm, Cuff Size: Standard)   Pulse 98   Temp (!) 101.9 °F (38.8 °C)   Resp 16   Ht 5' 11\" (1.803 m)   Wt 77.2 kg (170 lb 3.2 oz)   SpO2 98%   BMI 23.74 kg/m²      Physical Exam  Vitals reviewed.   Constitutional:       General: He is not in acute distress.     Appearance: Normal appearance. He is not ill-appearing.   HENT:      Head: Normocephalic and atraumatic.      Right Ear: Tympanic membrane, ear canal and external ear normal.      Left Ear: Tympanic membrane, ear canal and external ear normal.      Nose: Congestion present.      Mouth/Throat:      Mouth: Mucous membranes are moist.      Pharynx: Oropharynx is clear.     Eyes:      Pupils: Pupils are equal, round, and reactive to light.       Cardiovascular:      Rate and Rhythm: Normal rate and regular rhythm.      Pulses: Normal pulses.      Heart sounds: No murmur heard.  Pulmonary:      Effort: Pulmonary " effort is normal.      Breath sounds: Normal breath sounds. No wheezing or rales.     Musculoskeletal:         General: Normal range of motion.      Cervical back: Normal range of motion and neck supple.      Right lower leg: No edema.      Left lower leg: No edema.   Lymphadenopathy:      Cervical: No cervical adenopathy.     Skin:     General: Skin is warm and dry.     Neurological:      General: No focal deficit present.      Mental Status: He is alert and oriented to person, place, and time.     Psychiatric:         Mood and Affect: Mood normal.         Behavior: Behavior normal.

## 2025-05-27 ENCOUNTER — TELEPHONE (OUTPATIENT)
Age: 65
End: 2025-05-27

## 2025-05-27 NOTE — TELEPHONE ENCOUNTER
Patient spouse called requesting lab order to check sodium levels. She was advise that patient needs a follow up appointment since patient was admitted in the hospital. She stated that she will call back to schedule. Can lab order be fax to [facility] in [place].

## 2025-05-28 DIAGNOSIS — E87.1 HYPONATREMIA: Primary | ICD-10-CM

## 2025-05-28 NOTE — TELEPHONE ENCOUNTER
Please inform patient that a BMP was ordered.  He needs to schedule TCM.  He was admitted through [facility], but I cannot see any labs.

## 2025-06-03 ENCOUNTER — RESULTS FOLLOW-UP (OUTPATIENT)
Dept: FAMILY MEDICINE CLINIC | Facility: CLINIC | Age: 65
End: 2025-06-03

## 2025-06-03 LAB
T4 FREE SERPL-MCNC: 1.3 NG/DL (ref 0.8–1.8)
TSH SERPL-ACNC: 1.98 MIU/L (ref 0.4–4.5)

## 2025-06-05 ENCOUNTER — TELEPHONE (OUTPATIENT)
Age: 65
End: 2025-06-05

## 2025-06-05 ENCOUNTER — RESULTS FOLLOW-UP (OUTPATIENT)
Dept: FAMILY MEDICINE CLINIC | Facility: CLINIC | Age: 65
End: 2025-06-05

## 2025-06-05 LAB
BUN SERPL-MCNC: 15 MG/DL (ref 7–25)
BUN/CREAT SERPL: ABNORMAL (CALC) (ref 6–22)
CALCIUM SERPL-MCNC: 9.4 MG/DL (ref 8.6–10.3)
CHLORIDE SERPL-SCNC: 101 MMOL/L (ref 98–110)
CO2 SERPL-SCNC: 27 MMOL/L (ref 20–32)
CREAT SERPL-MCNC: 0.73 MG/DL (ref 0.7–1.35)
GFR/BSA.PRED SERPLBLD CYS-BASED-ARV: 102 ML/MIN/1.73M2
GLUCOSE SERPL-MCNC: 98 MG/DL (ref 65–99)
POTASSIUM SERPL-SCNC: 4.9 MMOL/L (ref 3.5–5.3)
SODIUM SERPL-SCNC: 134 MMOL/L (ref 135–146)

## 2025-06-05 NOTE — TELEPHONE ENCOUNTER
Patients wife calling that they needs labs results of his sodium faxed to [phone] Dr Gilbert HERNANDEZ

## 2025-06-06 ENCOUNTER — OFFICE VISIT (OUTPATIENT)
Dept: FAMILY MEDICINE CLINIC | Facility: CLINIC | Age: 65
End: 2025-06-06
Payer: COMMERCIAL

## 2025-06-06 ENCOUNTER — TELEPHONE (OUTPATIENT)
Age: 65
End: 2025-06-06

## 2025-06-06 VITALS
OXYGEN SATURATION: 98 % | TEMPERATURE: 98.1 F | HEIGHT: 71 IN | WEIGHT: 159.8 LBS | SYSTOLIC BLOOD PRESSURE: 138 MMHG | BODY MASS INDEX: 22.37 KG/M2 | HEART RATE: 97 BPM | DIASTOLIC BLOOD PRESSURE: 72 MMHG

## 2025-06-06 DIAGNOSIS — E03.9 ACQUIRED HYPOTHYROIDISM: ICD-10-CM

## 2025-06-06 DIAGNOSIS — E87.1 HYPONATREMIA: ICD-10-CM

## 2025-06-06 DIAGNOSIS — I49.9 IRREGULAR HEART RHYTHM: ICD-10-CM

## 2025-06-06 DIAGNOSIS — R93.1 DECREASED CARDIAC EJECTION FRACTION: ICD-10-CM

## 2025-06-06 DIAGNOSIS — Z76.89 ENCOUNTER FOR SUPPORT AND COORDINATION OF TRANSITION OF CARE: Primary | ICD-10-CM

## 2025-06-06 DIAGNOSIS — R91.1 LESION OF RIGHT LUNG: ICD-10-CM

## 2025-06-06 PROBLEM — U07.1 COVID: Status: ACTIVE | Noted: 2025-05-23

## 2025-06-06 PROCEDURE — 99214 OFFICE O/P EST MOD 30 MIN: CPT | Performed by: NURSE PRACTITIONER

## 2025-06-06 RX ORDER — UREA 15 G
15 POWDER IN PACKET (EA) ORAL 2 TIMES DAILY
COMMUNITY
Start: 2025-05-26

## 2025-06-06 RX ORDER — LEVOTHYROXINE SODIUM 75 UG/1
1 TABLET ORAL DAILY
COMMUNITY
Start: 2025-05-26

## 2025-06-06 NOTE — ASSESSMENT & PLAN NOTE
Patient completed sodium pack as prescribed by hospital.  Recent sodium levels started to increase.  Discussed fluid restriction, safe intake of sodium.  Advised to monitor blood pressure as educated.  Referral to nephrology given.  To obtain repeat blood work in 6 to 8 weeks.    Orders:    Ambulatory Referral to Nephrology; Future    Comprehensive metabolic panel; Future

## 2025-06-06 NOTE — ASSESSMENT & PLAN NOTE
Recently seen on CT scan during hospitalization.  Patients were advised to repeat imaging in 6 to 8 weeks, but unknown if they were told with chest x-ray or CT scan.  At this time, will order CT scan of chest to repeat in 6-8 weeks.  Referral to pulmonology given.    Orders:    Ambulatory Referral to Pulmonology; Future    CT chest wo contrast; Future

## 2025-06-06 NOTE — ASSESSMENT & PLAN NOTE
Patient recently had increase of levothyroxine from 50 mcg to 75 mcg.  Advised to obtain blood work in 6 to 8 weeks as ordered.  To continue levothyroxine 75 mcg as ordered.    Orders:    TSH, 3rd generation with Free T4 reflex; Future    T4, free; Future

## 2025-06-06 NOTE — TELEPHONE ENCOUNTER
We have received a referral for this patient, there are no openings within the recommended timeframe. Please advise    Date Referral Received: 6/6/25    Priority of Referral: Routine    Referred by: ZACH Khan    Diagnosis: Lesion of right lung (CT 5/23/25, 12mm lesion)    Time Frame: 3 days    Location: [place]

## 2025-06-09 ENCOUNTER — OFFICE VISIT (OUTPATIENT)
Dept: LAB | Facility: HOSPITAL | Age: 65
End: 2025-06-09
Payer: COMMERCIAL

## 2025-06-09 DIAGNOSIS — I49.9 IRREGULAR HEART RHYTHM: ICD-10-CM

## 2025-06-09 LAB
ATRIAL RATE: 86 BPM
P AXIS: 55 DEGREES
PR INTERVAL: 146 MS
QRS AXIS: 88 DEGREES
QRSD INTERVAL: 98 MS
QT INTERVAL: 388 MS
QTC INTERVAL: 465 MS
T WAVE AXIS: 37 DEGREES
VENTRICULAR RATE: 86 BPM

## 2025-06-09 PROCEDURE — 93010 ELECTROCARDIOGRAM REPORT: CPT | Performed by: INTERNAL MEDICINE

## 2025-06-09 PROCEDURE — 93005 ELECTROCARDIOGRAM TRACING: CPT

## 2025-06-10 ENCOUNTER — CONSULT (OUTPATIENT)
Dept: PULMONOLOGY | Facility: MEDICAL CENTER | Age: 65
End: 2025-06-10
Payer: COMMERCIAL

## 2025-06-10 ENCOUNTER — TELEPHONE (OUTPATIENT)
Age: 65
End: 2025-06-10

## 2025-06-10 VITALS
BODY MASS INDEX: 22.4 KG/M2 | DIASTOLIC BLOOD PRESSURE: 82 MMHG | WEIGHT: 160 LBS | TEMPERATURE: 100 F | OXYGEN SATURATION: 99 % | SYSTOLIC BLOOD PRESSURE: 140 MMHG | HEIGHT: 71 IN | HEART RATE: 69 BPM | RESPIRATION RATE: 12 BRPM

## 2025-06-10 DIAGNOSIS — B33.24 VIRAL CARDIOMYOPATHY (HCC): ICD-10-CM

## 2025-06-10 DIAGNOSIS — Z87.891 FORMER SMOKER: ICD-10-CM

## 2025-06-10 DIAGNOSIS — U07.1 COVID: ICD-10-CM

## 2025-06-10 DIAGNOSIS — E87.1 HYPONATREMIA: ICD-10-CM

## 2025-06-10 DIAGNOSIS — R91.1 LUNG NODULE: Primary | ICD-10-CM

## 2025-06-10 PROCEDURE — 99204 OFFICE O/P NEW MOD 45 MIN: CPT | Performed by: INTERNAL MEDICINE

## 2025-06-10 PROCEDURE — 94010 BREATHING CAPACITY TEST: CPT | Performed by: INTERNAL MEDICINE

## 2025-06-10 RX ORDER — METOPROLOL TARTRATE 25 MG/1
25 TABLET, FILM COATED ORAL 2 TIMES DAILY
COMMUNITY
Start: 2025-05-26 | End: 2025-06-16 | Stop reason: ALTCHOICE

## 2025-06-10 NOTE — TELEPHONE ENCOUNTER
Lisa / DANIEL reading room returning call for Sari    Stating to let Sari know they do not get medical records from [facility] / offices have to request those themselves .     [facility] medical records : [phone]    Lisa's call back :  [phone] / option 3    I did MOSHE Guo who said the issue has already been taken care of.

## 2025-06-10 NOTE — PATIENT INSTRUCTIONS
I placed order for PET CT scan.  Once you know when you are scheduled please let me know.  If you need time extended for out of work let us know we can give you note    Your pulmonary function test today showed normal lung volumes    Once your PET CT scan is done I will talk to you now    PET CT scan will determine next step

## 2025-06-11 NOTE — ASSESSMENT & PLAN NOTE
Huseyin had echocardiogram done on 5/24/2025 during his hospital stay for severe hyponatremia and confusion related to COVID infection.  This revealed his left ventricle systolic function be severely decreased with left ventricular ejection fraction of 30 to 35%.  The left ventricle size was normal but there was global hypokinesis.  There was grade 1 diastolic dysfunction and mild mitral regurgitation.  Right ventricular cavity size and function was normal.    Possibility  this cardiomyopathy could have been induced by his recent COVID infection.   He is not have any signs of congestive heart failure.  No leg edema.  He has been referred to cardiology and ihas appointment on 7/8/2025

## 2025-06-11 NOTE — ASSESSMENT & PLAN NOTE
Huseyin had a CTA PE study of chest done on 5/23/2025 when he presented to [facility] for evaluation of weakness and confusion related to recent COVID infection.  Just before that on May 17 he had chest x-ray showed that nodule that was about 1 cm right upper lobe.  The CT scan of chest did reveal that he did have a spiculated 12 mm nodule in the right upper lobe.  No hilar or mediastinal adenopathy.  No evidence of PE or lung infiltrates.  No pleural effusions because of spiculated (had suspicious features for possible malignancy.    I did order a PET CT scan to help determine if this could be a hyper metabolic lesion possibly due to an early lung cancer.  I will have images from [facility] PACS system to our PACS system for me to personally review.    Once PET CT scan is completed I will contact patient and make further recommendations.    There is a family history of lung cancer.  His paternal grandfather had lung cancer    Orders:    NM PET CT skull base to mid thigh; Future

## 2025-06-11 NOTE — ASSESSMENT & PLAN NOTE
He had recent problems with hyponatremia and confusion and serum serum was decreased to 123.  He was mated to the hospital at [facility] for several days and treated for SIADH and improved.  Most recent serum sodium done on 6/4/2025 showed his serum sodium had improved up to 134.  He is going to have blood work done 1 more week to make sure that it remains stable or improves further.

## 2025-06-11 NOTE — PROGRESS NOTES
Consultation - Pulmonary Medicine   Name: Huseyin Mckay      : 1960      MRN: [id number]  Encounter Provider: Jose Louie DO  Encounter Date: 6/10/2025   Encounter department: [facility] [place]    Requesting Provider:   Erendira Khan  [address] [place] [address]     Reason for Consult: Here for evaluation of right upper lobe spiculated nodule :  Assessment & Plan  Lung nodule  Huseyin had a CTA PE study of chest done on 2025 when he presented to [facility] for evaluation of weakness and confusion related to recent COVID infection.  Just before that on May 17 he had chest x-ray showed that nodule that was about 1 cm right upper lobe.  The CT scan of chest did reveal that he did have a spiculated 12 mm nodule in the right upper lobe.  No hilar or mediastinal adenopathy.  No evidence of PE or lung infiltrates.  No pleural effusions because of spiculated (had suspicious features for possible malignancy.    I did order a PET CT scan to help determine if this could be a hyper metabolic lesion possibly due to an early lung cancer.  I will have images from [facility] PACS system to our PACS system for me to personally review.    Once PET CT scan is completed I will contact patient and make further recommendations.    There is a family history of lung cancer.  His paternal grandfather had lung cancer    Orders:    NM PET CT skull base to mid thigh; Future    Hyponatremia  He had recent problems with hyponatremia and confusion and serum serum was decreased to 123.  He was mated to the hospital at [facility] for several days and treated for SIADH and improved.  Most recent serum sodium done on 2025 showed his serum sodium had improved up to 134.  He is going to have blood work done 1 more week to make sure that it remains stable or improves further.         Former smoker  He just quit smoking about 3 to 4 weeks  ago.  Previously smoked 1/2 pack of cigarettes per day from age 20 to age 32 then was smoking about 2 to 3 cigars a day.  These were small cigars and he did this for the past 60 years.    Spirometry today shows lung volumes to be normal no evidence of any airflow obstruction.    Encouraged him at his efforts to remain smoke-free    Orders:    POCT spirometry    COVID  Recent COVID infection which was finally diagnosed by nasal swab on 5/20/2025 he subsequently had worsening weakness and developed hyponatremia was hospitalized from 5/23 to 5/26/25.  He feels better but still has some residual weakness.  Not having any shortness of breath         Viral cardiomyopathy (HCC)  Huseyin had echocardiogram done on 5/24/2025 during his hospital stay for severe hyponatremia and confusion related to COVID infection.  This revealed his left ventricle systolic function be severely decreased with left ventricular ejection fraction of 30 to 35%.  The left ventricle size was normal but there was global hypokinesis.  There was grade 1 diastolic dysfunction and mild mitral regurgitation.  Right ventricular cavity size and function was normal.    Possibility  this cardiomyopathy could have been induced by his recent COVID infection.   He is not have any signs of congestive heart failure.  No leg edema.  He has been referred to cardiology and ihas appointment on 7/8/2025         Return in about 6 weeks (around 7/22/2025).  History of Present Illness   Huseyin Mckay is a 65 y.o. male who presents for evaluation of suspected right upper lobe lung nodule recently detected.  Huseyin just recently quit smoking month ago initiated been smoking 1/2 pack of cigarettes per day from age 20 to age 32 then switched to small cigars which she smoked about 2 or so per day for 30 years.    He did go to a [facility] now on May 17 complaining of headache for 3 days, cough which was productive of clear mucus and have some chills and sweats.  He  did 2 COVID tests at home which were good.  Was also started have some shortness of breath.  He had a chest x-ray done then which showed a small 1 cm right apical lung nodule.  No evidence of any lung infiltrates.  Heart size was normal.  He was prescribed azithromycin, benzonatate and dextromethorphan-guaifenesin.    He was not feeling any better and he went to his [facility] provider on 5/20/2025 and a rapid COVID test done and was positive.  He was told to continue with over-the-counter medication for symptoms of cough and ache.  He started to feel worse and is having ongoing headache, body aches, weakness and cough.  He started to have confusion at home.  He does present to [facility] emergency room 5/3/25 subsequently admitted to the hospital.  He did have low-grade fever 100.2 degrees in ER.  Blood pressure was satisfactory but his serum sodium was decreased to 123.  A CTA PE study of chest was done that showed a 12 mm spiculated nodule in the right upper lobe.  This was suspicious for possible malignancy.  No lung infiltrates.  He was felt to have component of SIADH due to infection.  He was treated with IV fluids initially then placed on fluid restriction and also received tolvaptan and his sodium level did improve.  He did have outpatient BMP done on 6/4/2025 and his sodium levels improved to 134    Echo done on 5/24/25 LV systolic function be severely decreased at 30 to 35% of predicted.  The size was normal size but was global hypokinesis.  There was grade 1 diastolic dysfunction.  There was mild mitral regurgitation.  Right ventricular cavity size was normal and systolic function normal.    He has no history of heart disease.  No history of COPD or asthma.  Does have some mild seasonal allergies.  Does have history of hypothyroidism and is on thyroid supplement.  No history of coronary disease or MI    Has some fatigue and weakness but is not having any shortness of breath and not have much in way  "of any cough.    He is accompanied by his wife today    Review of Systems   Constitutional:  Negative for chills, fever and unexpected weight change.   HENT:  Negative for congestion, rhinorrhea and sore throat.    Eyes:  Negative for discharge and redness.   Respiratory:  Negative for cough, shortness of breath and wheezing.    Cardiovascular:  Negative for chest pain, palpitations and leg swelling.   Gastrointestinal:  Negative for abdominal distention, abdominal pain and nausea.   Endocrine: Negative for polydipsia and polyphagia.   Genitourinary:  Negative for dysuria.   Musculoskeletal:  Negative for joint swelling and myalgias.   Skin:  Negative for rash.   Neurological:  Positive for weakness. Negative for light-headedness.   Psychiatric/Behavioral:  Negative for confusion.        Aside from what is mentioned in the HPI, ROS is otherwise negative    Medical History Reviewed by provider this encounter:  Tobacco  Allergies  Meds  Problems  Med Hx  Surg Hx  Fam Hx  Soc   Hx    .    Historical Information   Past Medical History[1] Hypothyroidism, recent COVID infection  Past Surgical History[2] Left inguinal herniorrhaphy and right knee arthroscopic surgery x 2  Social History   Tobacco Use History[3] Quit smoking 1 month ago and smoked 1/2 pack cigarettes per day from age [age] and then started smoking 1 to 2 cigars or more per day for 30 years    Occupational/Environmental history:  Works in a warehouse    Family History:   Family History[4] Terminal grandfather had lung cancer    Objective   /82 (BP Location: Left arm, Patient Position: Sitting, Cuff Size: Standard)   Pulse 69   Temp 100 °F (37.8 °C) (Temporal)   Resp 12   Ht 5' 11\" (1.803 m)   Wt 72.6 kg (160 lb)   SpO2 99%   BMI 22.32 kg/m²      Physical Exam  Vitals reviewed.   Constitutional:       General: He is not in acute distress.     Appearance: Normal appearance. He is well-developed.   HENT:      Head: Normocephalic.      Right " Ear: External ear normal.      Left Ear: External ear normal.      Nose: Nose normal.      Mouth/Throat:      Mouth: Mucous membranes are moist.      Pharynx: Oropharynx is clear. No oropharyngeal exudate.     Eyes:      Conjunctiva/sclera: Conjunctivae normal.      Pupils: Pupils are equal, round, and reactive to light.       Cardiovascular:      Rate and Rhythm: Normal rate and regular rhythm.      Heart sounds: Normal heart sounds.   Pulmonary:      Effort: Pulmonary effort is normal.      Comments: Lung sounds are clear.  No wheezes, crackles or rhonchi  Abdominal:      General: There is no distension.      Palpations: Abdomen is soft.      Tenderness: There is no abdominal tenderness.     Musculoskeletal:      Cervical back: Neck supple.      Comments: No edema, cyanosis, or clubbing   Lymphadenopathy:      Cervical: No cervical adenopathy.     Skin:     General: Skin is warm and dry.     Neurological:      General: No focal deficit present.      Mental Status: He is alert and oriented to person, place, and time.     Psychiatric:         Mood and Affect: Mood normal.         Behavior: Behavior normal.         Thought Content: Thought content normal.           Diagnostic Data:  Labs: I personally reviewed the most recent laboratory data pertinent to today's visit.  6/4/2025  Sodium 134, potassium 4.9 CO2 27 BUN 15 creatinine 0.73 calcium 9.4        Radiology results:  Radiology Results Review: I personally reviewed the following image studies in PACS and associated radiology reports: chest xray. My interpretation of the radiology images/reports is: Chest x-ray done 5/17/2025.  This showed a 1 cm right apical nodule.  No evidence of any lung infiltrates.  Heart size normal.    5/23/2025 CTA chest with and without contrast this was done at [facility]    I do not have images to review on the CTA chest but will have images pushed over from [facility] to our PACS system for review tomorrow.  This  reported a 12 mm spiculated lesion in the right upper lobe suspicious for malignancy.  No evidence of any pulmonary embolism.  There were some coronary artery atherosclerotic aortic and vascular calcifications.  No lung infiltrates.    PFT/spirometry results: done today    FVC  -  4.38 L    90%  FEV1 -  3.17 L    87%  FEV1/FVC% - 72%    Lung volumes are normal.  No evidence of any airflow obstruction        Jose Louie DO           [1]   Past Medical History:  Diagnosis Date    Allergic     Disease of thyroid gland     Hernia, inguinal     Torn meniscus     rt knee x 3   [2]   Past Surgical History:  Procedure Laterality Date    INGUINAL HERNIA REPAIR Left     Done around age 16    KNEE ARTHROSCOPY Right     Had the surgery twice on his right knee   [3]   Social History  Tobacco Use   Smoking Status Former    Types: Cigars    Passive exposure: Never   Smokeless Tobacco Never   Tobacco Comments    Only smoked cigars or cigarettes once in a while    [4]   Family History  Problem Relation Name Age of Onset    No Known Problems Mother      Hypertension Father amy bailey     Heart disease Father amy bailey     Diabetes Brother karina bailey     Diabetes Brother cayetano bailey     Lung cancer Paternal Grandfather      No Known Problems Daughter      No Known Problems Son

## 2025-06-11 NOTE — ASSESSMENT & PLAN NOTE
Recent COVID infection which was finally diagnosed by nasal swab on 5/20/2025 he subsequently had worsening weakness and developed hyponatremia was hospitalized from 5/23 to 5/26/25.  He feels better but still has some residual weakness.  Not having any shortness of breath

## 2025-06-11 NOTE — ASSESSMENT & PLAN NOTE
He just quit smoking about 3 to 4 weeks ago.  Previously smoked 1/2 pack of cigarettes per day from age 20 to age 32 then was smoking about 2 to 3 cigars a day.  These were small cigars and he did this for the past 60 years.    Spirometry today shows lung volumes to be normal no evidence of any airflow obstruction.    Encouraged him at his efforts to remain smoke-free    Orders:    POCT spirometry

## 2025-06-13 ENCOUNTER — TELEPHONE (OUTPATIENT)
Age: 65
End: 2025-06-13

## 2025-06-13 NOTE — TELEPHONE ENCOUNTER
Naida called stating that patient has to go on short term disability and will be faxing over paperwork for the provider to fill out

## 2025-06-16 ENCOUNTER — OFFICE VISIT (OUTPATIENT)
Dept: CARDIOLOGY CLINIC | Facility: CLINIC | Age: 65
End: 2025-06-16
Payer: COMMERCIAL

## 2025-06-16 VITALS
RESPIRATION RATE: 16 BRPM | SYSTOLIC BLOOD PRESSURE: 150 MMHG | BODY MASS INDEX: 22.54 KG/M2 | HEART RATE: 86 BPM | DIASTOLIC BLOOD PRESSURE: 96 MMHG | WEIGHT: 161 LBS | HEIGHT: 71 IN | OXYGEN SATURATION: 99 %

## 2025-06-16 DIAGNOSIS — I42.9 CARDIOMYOPATHY, UNSPECIFIED TYPE (HCC): Primary | ICD-10-CM

## 2025-06-16 PROCEDURE — 99204 OFFICE O/P NEW MOD 45 MIN: CPT | Performed by: INTERNAL MEDICINE

## 2025-06-16 RX ORDER — CARVEDILOL 3.12 MG/1
3.12 TABLET ORAL 2 TIMES DAILY WITH MEALS
Qty: 60 TABLET | Refills: 4 | Status: SHIPPED | OUTPATIENT
Start: 2025-06-16

## 2025-06-16 RX ORDER — LOSARTAN POTASSIUM 25 MG/1
25 TABLET ORAL DAILY
Qty: 30 TABLET | Refills: 5 | Status: SHIPPED | OUTPATIENT
Start: 2025-06-16

## 2025-06-16 NOTE — H&P (VIEW-ONLY)
Cardiology Consultation     Huseyin Mckay  [id number]  1960  [address] [facility] [place]  [address]  [facility] [address]    This patient presents for cardiology consultation and evaluation.  Patient was recently hospitalized at [facility] for severe hyponatremia.  Patient also had COVID.  Patient was found to have new onset cardiomyopathy with ejection fraction of 30 to 35%.  Patient also has history of smoking in the past.  Patient has history of lung nodules and was evaluated by pulmonary recently.  Patient does not have any history of coronary artery disease or MI in the past.  Patient denies any chest pain.  No shortness of breath out of the ordinary.  No history of leg edema orthopnea PND.  His sodium had improved to 134 recently.    No previous cardiac evaluation.  Patient does work which involves heavy lifting.    Patient denies family history of premature CAD.  Patient also denies any history of leg edema orthopnea PND.  No history of presyncope syncope.  He was on metoprolol but he was unable to tolerate because of fatigue and he stopped it.  His blood pressures have been on the higher side.  He does not have any history of hypertension in the past.      1. Cardiomyopathy, unspecified type (HCC)          Problem List[1]  Past Medical History[2]  Social History     Socioeconomic History    Marital status: /Civil Union     Spouse name: Not on file    Number of children: Not on file    Years of education: Not on file    Highest education level: Not on file   Occupational History    Not on file   Tobacco Use    Smoking status: Former     Types: Cigars     Passive exposure: Never    Smokeless tobacco: Never    Tobacco comments:     Only smoked cigars or cigarettes once in a while    Vaping Use    Vaping status: Never Used   Substance and Sexual Activity    Alcohol use: Yes     Comment: on  weekends    Drug use: Never    Sexual activity: Not on file   Other Topics Concern    Not on file   Social History Narrative    Not on file     Social Drivers of Health     Financial Resource Strain: Not At Risk (5/26/2025)    Received from [facility]    Financial Insecurity     In the last 12 months did you skip medications to save money?: No     In the last 12 months was there a time when you needed to see a doctor but could not because of cost?: No   Food Insecurity: Patient Declined (5/26/2025)    Received from [facility]    Food Insecurity     In the last 12 months did you ever eat less than you felt you should because there wasn't enough money for food?: Decline to Answer   Transportation Needs: No Transportation Needs (5/26/2025)    Received from [facility]    Transportation Needs     In the last 12 months have you ever had to go without healthcare because you didn't have a way to get there?: No   Physical Activity: Not on file   Stress: Not on file   Social Connections: Socially Integrated (5/26/2025)    Received from [facility]    Social Connection     Do you often feel lonely?: No   Intimate Partner Violence: Not At Risk (5/23/2025)    Received from [facility]    Humiliation, Afraid, Rape, and Kick questionnaire     Within the last year, have you been afraid of your partner or ex-partner?: No     Within the last year, have you been humiliated or emotionally abused in other ways by your partner or ex-partner?: No     Within the last year, have you been kicked, hit, slapped, or otherwise physically hurt by your partner or ex-partner?: No     Within the last year, have you been raped or forced to have any kind of sexual activity by your partner or ex-partner?: No   Housing Stability: Not At Risk (5/26/2025)    Received from [facility]    Housing Stability     Are you worried that in the next 2 months  "you may not have stable housing?: No      Family History[3]  Past Surgical History[4]  Current Medications[5]  No Known Allergies  Vitals:    06/16/25 0846   BP: 150/96   BP Location: Left arm   Patient Position: Sitting   Cuff Size: Standard   Pulse: 86   Resp: 16   SpO2: 99%   Weight: 73 kg (161 lb)   Height: 5' 11\" (1.803 m)       Labs:  Office Visit on 06/09/2025   Component Date Value    Ventricular Rate 06/09/2025 86     Atrial Rate 06/09/2025 86     CA Interval 06/09/2025 146     QRSD Interval 06/09/2025 98     QT Interval 06/09/2025 388     QTC Interval 06/09/2025 465     P Axis 06/09/2025 55     QRS Axis 06/09/2025 88     T Wave [place] 06/09/2025 37    Orders Only on 06/04/2025   Component Date Value    Glucose, Random 06/04/2025 98     BUN 06/04/2025 15     Creatinine 06/04/2025 0.73     eGFR 06/04/2025 102     SL AMB BUN/CREATININE RA* 06/04/2025 SEE NOTE:     Sodium 06/04/2025 134 (L)     Potassium 06/04/2025 4.9     Chloride 06/04/2025 101     CO2 06/04/2025 27     Calcium 06/04/2025 9.4    Orders Only on 06/02/2025   Component Date Value    Free t4 06/02/2025 1.3     TSH 06/02/2025 1.98    Office Visit on 05/20/2025   Component Date Value    POCT SARS-CoV-2 Ag 05/20/2025 Positive (A)     VALID CONTROL 05/20/2025 Valid      Imaging: CT chest w contrast  Result Date: 6/11/2025  Narrative: [id number]    POCT spirometry  Impression: FVC  -  4.38 L    90% FEV1 -  3.17 L    87% FEV1/FVC% - 72%  Lung volumes are normal.  No evidence of any airflow obstruction     ECHO 2D COMPLETE  Result Date: 5/24/2025  Narrative:   Left Ventricle: Systolic function is severely decreased with an ejection fraction of 30-35%. Global hypokinesis. There is grade I (mild) diastolic dysfunction.   Mitral Valve: There is mild regurgitation. Left Ventricle Left ventricle is normal in size. Wall thickness is normal. Systolic function is severely decreased with an ejection fraction of " 30-35%. Global hypokinesis. There is grade I (mild) diastolic dysfunction. Right Ventricle Right ventricle cavity is normal. Systolic function is normal. Left Atrium Left atrium cavity size is normal. Right Atrium Right atrium cavity is normal. IVC/SVC The inferior vena cava demonstrates a diameter of <=21 mm and collapses >50%; therefore, the right atrial pressure is estimated at 0-5 mmHg. Mitral Valve Mitral valve structure is normal. There is mild regurgitation. There is no evidence of mitral valve stenosis. Tricuspid Valve Tricuspid valve structure is normal. There is trace regurgitation. There is no evidence of tricuspid valve stenosis. Aortic Valve The aortic valve is trileaflet. There is no regurgitation or stenosis. Pulmonic Valve Pulmonic valve structure is normal. There is no regurgitation or stenosis. Ascending Aorta The aorta appears normal in size. Pericardium There is no pericardial effusion. Study Details A complete 2D echocardiogram was performed. Color flow, Pulse Wave and Continuous Wave Doppler was performed and analyzed.Overall the study quality was adequate.    CTA chest wo w contrast  Result Date: 5/23/2025  Narrative: History: Pulmonary embolism suspected. Comments: CT scan of the chest is performed from the level of lung apices to the level of upper abdomen with the use of intravenous contrast. Comparison is made with prior chest x-ray done on 5/23/2025. 3-D rendering: MIP and/or volumetric reformations. Coronal and sagittal reformatted images are obtained utilizing MPR. There are no findings of pulmonary embolism. There are coronary artery, atherosclerotic aortic and vascular calcifications. There are no findings of aortic aneurysm or aortic dissection. There are no pathologically enlarged mediastinal or hilar lymph nodes are noted. There are bilateral axillary lymph nodes noted with central fatty hilum. There are no findings of pulmonary infiltrate or pleural effusion. The right upper lobe  spiculated lesion measures 12.0 mm seen on image 32 series 3 suspicious for malignancy. Thoracic spine multilevel osteoarthritic changes are seen. There are no findings of osteoblastic/osteolytic metastasis.    Impression: IMPRESSION: 1. The right upper lobe spiculated lesion measures 12.0 mm seen on image 32 series 3 suspicious for malignancy. The pulmonology consult and PET scan would be recommended for further evaluation. 2. There are no findings of pulmonary embolism. 3. There are coronary artery, atherosclerotic aortic and vascular calcifications. There are no findings of aortic aneurysm or aortic dissection. 4. The report of the critical findings were notified to Nahum Souza MD emergency department physician via [employer] on 5/23/2025 at 12:36 PM. Questions or Further discussion? Please contact me: [employer]/Text/Cell phone: Craig Hurtado MD  [phone]. Email: [email or web address] General Radiology #: [phone]      Workstation:[id number]    XR chest portable  Result Date: 5/23/2025  Narrative: History: Sepsis alert Study: XR CHEST 1 VW PORTABLE Comparison: None    Impression: Impression: Heart size is normal. Lungs are clear. No consolidation, effusion or pneumothorax. Healed mid left clavicular fracture noted. Workstation:[id number]    XR chest pa and lateral  Result Date: 5/18/2025  Narrative: XR CHEST PA AND LATERAL DUAL ENERGY SUBTRACTION. INDICATION: R53.83: Other fatigue R09.89: Other specified symptoms and signs involving the circulatory and respiratory systems. Had congestion and cough for 3 days. COMPARISON: None FINDINGS: No consolidation. Question 1 cm right apical nodule, most conspicuous on the soft tissue image. No pneumothorax or pleural effusion. Normal cardiomediastinal silhouette. Bones are unremarkable for age. Healed left clavicle fracture. Normal upper abdomen.     Impression: No acute cardiopulmonary disease. Question 1 cm right apical nodule, most conspicuous  on the soft tissue image. Recommend follow-up with PA and lateral chest radiographs with dual energy subtraction in 6 weeks to determine if this is a true lung nodule versus an inflammatory nodule which  resolves or artifact. This study was marked in Epic for significant notification and follow up. Workstation performed: [id number]       Review of Systems:  Review of Systems  REVIEW OF SYSTEMS:  Constitutional:  Denies fever or chills   Eyes:  Denies change in visual acuity   HENT:  Denies nasal congestion or sore throat   Respiratory:  Denies cough or shortness of breath   Cardiovascular:  Denies chest pain or edema   GI:  Denies abdominal pain, nausea, vomiting, bloody stools or diarrhea   :  Denies dysuria, frequency, difficulty in micturition and nocturia  Musculoskeletal:  Denies back pain or joint pain   Neurologic:  Denies headache, focal weakness or sensory changes   Endocrine:  Denies polyuria or polydipsia   Lymphatic:  Denies swollen glands   Psychiatric:  Denies depression or anxiety      Physical Exam:  Physical Exam  PHYSICAL EXAM:  General:  Patient is not in acute distress   Head: Normocephalic, Atraumatic.  HEENT:  Both pupils normal-size atraumatic, normocephalic, nonicteric  Neck:  JVP not raised. Trachea central. No carotid bruit  Respiratory:  normal breath sounds no crackles. no rhonchi  Cardiovascular:  Regular rate and rhythm no S3 no murmurs  GI:  Abdomen soft nontender. No organomegaly.   Lymphatic:  No cervical or inguinal lymphadenopathy  Neurologic:  Patient is awake alert, oriented . Grossly nonfocal  Extremities no edema    Echocardiogram done on 5/24/2025 done at [facility] showed ejection fraction of 30 to 35% with global hypokinesis and grade 1 diastolic dysfunction.    Discussion/Summary:    This patient has new onset cardiomyopathy with ejection fraction of 30 to 35%.  Etiology of cardiomyopathy is unclear.  Lengthy discussion with patient and family regarding possible etiologies  for cardiomyopathy.  Patient does have risk factors for coronary artery disease.  Discussed options of cardiac catheterization to definitively rule out CAD as etiology.    Risks and benefits of cardiac catheterization and possible revascularization options including but not limited to risk of infection, bleeding, risk of myocardial infarction, stroke, and risk of death discussed at length with patient. Patient understands the risks and benefits and wishes to proceed. Cardiac catheterization will be scheduled . Preprocedure workup will be done. Further cardiac evaluation and management after the results of cardiac catheterization.    Patient denies any history of dye allergy.    Other possible etiologies including post-COVID, post other viral cardiomyopathy etc discussed with patient.    Patient has euvolemia on clinical examination.    Will do a trial of low-dose Coreg and see if he is able to tolerate this beta-blocker.  Also started on losartan.    Continue to optimize GDMT.  The rationale for the same discussed with patient and family at length.    Repeat limited echocardiogram to reassess ejection fraction.  If there is no recovery of EF, further discussion regarding prophylactic ICD.    Patient instructed not to return to work which involves heavy lifting till he is reevaluated in September of this year.    Patient and family had a lot of questions which were answered.         [1]   Patient Active Problem List  Diagnosis    Acquired hypothyroidism    Current smoker on some days    Hyponatremia    Lung nodule    COVID    Former smoker    Viral cardiomyopathy (HCC)   [2]   Past Medical History:  Diagnosis Date    Allergic     Disease of thyroid gland     Hernia, inguinal     Torn meniscus     rt knee x 3   [3]   Family History  Problem Relation Name Age of Onset    No Known Problems Mother      Hypertension Father amy bailey     Heart disease Father amy bailey     Diabetes Brother karina leo     Diabetes  Brother cayetano bailey     Lung cancer Paternal Grandfather      No Known Problems Daughter      No Known Problems Son     [4]   Past Surgical History:  Procedure Laterality Date    INGUINAL HERNIA REPAIR Left     Done around age 16    KNEE ARTHROSCOPY Right     Had the surgery twice on his right knee   [5]   Current Outpatient Medications:     levothyroxine 75 mcg tablet, Take 1 tablet by mouth in the morning, Disp: , Rfl:

## 2025-06-16 NOTE — TELEPHONE ENCOUNTER
Craig from [employer] called asking if office received STD paperwork that was faxed to office on Friday.  Informed Craig of the following :      No further questions or concerns at this time

## 2025-06-16 NOTE — TELEPHONE ENCOUNTER
Called and spoke with Naida, she states that cardiology is filling out the paperwork and we can disregard.

## 2025-06-16 NOTE — PROGRESS NOTES
Cardiology Consultation     Huseyin Mckay  [id number]  1960  [address] [facility] [place]  [address] [address]  [facility], [place], operated by [facility] [id number]    This patient presents for cardiology consultation and evaluation.  Patient was recently hospitalized at [facility] for severe hyponatremia.  Patient also had COVID.  Patient was found to have new onset cardiomyopathy with ejection fraction of 30 to 35%.  Patient also has history of smoking in the past.  Patient has history of lung nodules and was evaluated by pulmonary recently.  Patient does not have any history of coronary artery disease or MI in the past.  Patient denies any chest pain.  No shortness of breath out of the ordinary.  No history of leg edema orthopnea PND.  His sodium had improved to 134 recently.    No previous cardiac evaluation.  Patient does work which involves heavy lifting.    Patient denies family history of premature CAD.  Patient also denies any history of leg edema orthopnea PND.  No history of presyncope syncope.  He was on metoprolol but he was unable to tolerate because of fatigue and he stopped it.  His blood pressures have been on the higher side.  He does not have any history of hypertension in the past.      1. Cardiomyopathy, unspecified type (HCC)          Problem List[1]  Past Medical History[2]  Social History     Socioeconomic History    Marital status: /Civil Union     Spouse name: Not on file    Number of children: Not on file    Years of education: Not on file    Highest education level: Not on file   Occupational History    Not on file   Tobacco Use    Smoking status: Former     Types: Cigars     Passive exposure: Never    Smokeless tobacco: Never    Tobacco comments:     Only smoked cigars or cigarettes once in a while    Vaping Use    Vaping status: Never Used   Substance and Sexual Activity    Alcohol use: Yes     Comment: on  weekends    Drug use: Never    Sexual activity: Not on file   Other Topics Concern    Not on file   Social History Narrative    Not on file     Social Drivers of Health     Financial Resource Strain: Not At Risk (5/26/2025)    Received from [facility]    Financial Insecurity     In the last 12 months did you skip medications to save money?: No     In the last 12 months was there a time when you needed to see a doctor but could not because of cost?: No   Food Insecurity: Patient Declined (5/26/2025)    Received from [facility]    Food Insecurity     In the last 12 months did you ever eat less than you felt you should because there wasn't enough money for food?: Decline to Answer   Transportation Needs: No Transportation Needs (5/26/2025)    Received from [facility]    Transportation Needs     In the last 12 months have you ever had to go without healthcare because you didn't have a way to get there?: No   Physical Activity: Not on file   Stress: Not on file   Social Connections: Socially Integrated (5/26/2025)    Received from [facility]    Social Connection     Do you often feel lonely?: No   Intimate Partner Violence: Not At Risk (5/23/2025)    Received from [facility]    Humiliation, Afraid, Rape, and Kick questionnaire     Within the last year, have you been afraid of your partner or ex-partner?: No     Within the last year, have you been humiliated or emotionally abused in other ways by your partner or ex-partner?: No     Within the last year, have you been kicked, hit, slapped, or otherwise physically hurt by your partner or ex-partner?: No     Within the last year, have you been raped or forced to have any kind of sexual activity by your partner or ex-partner?: No   Housing Stability: Not At Risk (5/26/2025)    Received from [facility]    Housing Stability     Are you worried that in the next 2 months  "you may not have stable housing?: No      Family History[3]  Past Surgical History[4]  Current Medications[5]  No Known Allergies  Vitals:    06/16/25 0846   BP: 150/96   BP Location: Left arm   Patient Position: Sitting   Cuff Size: Standard   Pulse: 86   Resp: 16   SpO2: 99%   Weight: 73 kg (161 lb)   Height: 5' 11\" (1.803 m)       Labs:  Office Visit on 06/09/2025   Component Date Value    Ventricular Rate 06/09/2025 86     Atrial Rate 06/09/2025 86     DC Interval 06/09/2025 146     QRSD Interval 06/09/2025 98     QT Interval 06/09/2025 388     QTC Interval 06/09/2025 465     P Axis 06/09/2025 55     QRS Axis 06/09/2025 88     T Wave [place] 06/09/2025 37    Orders Only on 06/04/2025   Component Date Value    Glucose, Random 06/04/2025 98     BUN 06/04/2025 15     Creatinine 06/04/2025 0.73     eGFR 06/04/2025 102     SL AMB BUN/CREATININE RA* 06/04/2025 SEE NOTE:     Sodium 06/04/2025 134 (L)     Potassium 06/04/2025 4.9     Chloride 06/04/2025 101     CO2 06/04/2025 27     Calcium 06/04/2025 9.4    Orders Only on 06/02/2025   Component Date Value    Free t4 06/02/2025 1.3     TSH 06/02/2025 1.98    Office Visit on 05/20/2025   Component Date Value    POCT SARS-CoV-2 Ag 05/20/2025 Positive (A)     VALID CONTROL 05/20/2025 Valid      Imaging: CT chest w contrast  Result Date: 6/11/2025  Narrative: [id number]    POCT spirometry  Impression: FVC  -  4.38 L    90% FEV1 -  3.17 L    87% FEV1/FVC% - 72%  Lung volumes are normal.  No evidence of any airflow obstruction     ECHO 2D COMPLETE  Result Date: 5/24/2025  Narrative:   Left Ventricle: Systolic function is severely decreased with an ejection fraction of 30-35%. Global hypokinesis. There is grade I (mild) diastolic dysfunction.   Mitral Valve: There is mild regurgitation. Left Ventricle Left ventricle is normal in size. Wall thickness is normal. Systolic function is severely decreased with an ejection fraction of " 30-35%. Global hypokinesis. There is grade I (mild) diastolic dysfunction. Right Ventricle Right ventricle cavity is normal. Systolic function is normal. Left Atrium Left atrium cavity size is normal. Right Atrium Right atrium cavity is normal. IVC/SVC The inferior vena cava demonstrates a diameter of <=21 mm and collapses >50%; therefore, the right atrial pressure is estimated at 0-5 mmHg. Mitral Valve Mitral valve structure is normal. There is mild regurgitation. There is no evidence of mitral valve stenosis. Tricuspid Valve Tricuspid valve structure is normal. There is trace regurgitation. There is no evidence of tricuspid valve stenosis. Aortic Valve The aortic valve is trileaflet. There is no regurgitation or stenosis. Pulmonic Valve Pulmonic valve structure is normal. There is no regurgitation or stenosis. Ascending Aorta The aorta appears normal in size. Pericardium There is no pericardial effusion. Study Details A complete 2D echocardiogram was performed. Color flow, Pulse Wave and Continuous Wave Doppler was performed and analyzed.Overall the study quality was adequate.    CTA chest wo w contrast  Result Date: 5/23/2025  Narrative: History: Pulmonary embolism suspected. Comments: CT scan of the chest is performed from the level of lung apices to the level of upper abdomen with the use of intravenous contrast. Comparison is made with prior chest x-ray done on 5/23/2025. 3-D rendering: MIP and/or volumetric reformations. Coronal and sagittal reformatted images are obtained utilizing MPR. There are no findings of pulmonary embolism. There are coronary artery, atherosclerotic aortic and vascular calcifications. There are no findings of aortic aneurysm or aortic dissection. There are no pathologically enlarged mediastinal or hilar lymph nodes are noted. There are bilateral axillary lymph nodes noted with central fatty hilum. There are no findings of pulmonary infiltrate or pleural effusion. The right upper lobe  spiculated lesion measures 12.0 mm seen on image 32 series 3 suspicious for malignancy. Thoracic spine multilevel osteoarthritic changes are seen. There are no findings of osteoblastic/osteolytic metastasis.    Impression: IMPRESSION: 1. The right upper lobe spiculated lesion measures 12.0 mm seen on image 32 series 3 suspicious for malignancy. The pulmonology consult and PET scan would be recommended for further evaluation. 2. There are no findings of pulmonary embolism. 3. There are coronary artery, atherosclerotic aortic and vascular calcifications. There are no findings of aortic aneurysm or aortic dissection. 4. The report of the critical findings were notified to Nahum Souza MD emergency department physician via [employer] on 5/23/2025 at 12:36 PM. Questions or Further discussion? Please contact me: [employer]/Text/Cell phone: Craig Hurtado MD  [phone]. Email: [email or web address] General Radiology #: [phone]      Workstation:[id number]    XR chest portable  Result Date: 5/23/2025  Narrative: History: Sepsis alert Study: XR CHEST 1 VW PORTABLE Comparison: None    Impression: Impression: Heart size is normal. Lungs are clear. No consolidation, effusion or pneumothorax. Healed mid left clavicular fracture noted. Workstation:[id number]    XR chest pa and lateral  Result Date: 5/18/2025  Narrative: XR CHEST PA AND LATERAL DUAL ENERGY SUBTRACTION. INDICATION: R53.83: Other fatigue R09.89: Other specified symptoms and signs involving the circulatory and respiratory systems. Had congestion and cough for 3 days. COMPARISON: None FINDINGS: No consolidation. Question 1 cm right apical nodule, most conspicuous on the soft tissue image. No pneumothorax or pleural effusion. Normal cardiomediastinal silhouette. Bones are unremarkable for age. Healed left clavicle fracture. Normal upper abdomen.     Impression: No acute cardiopulmonary disease. Question 1 cm right apical nodule, most conspicuous  on the soft tissue image. Recommend follow-up with PA and lateral chest radiographs with dual energy subtraction in 6 weeks to determine if this is a true lung nodule versus an inflammatory nodule which  resolves or artifact. This study was marked in Epic for significant notification and follow up. Workstation performed: [id number]       Review of Systems:  Review of Systems  REVIEW OF SYSTEMS:  Constitutional:  Denies fever or chills   Eyes:  Denies change in visual acuity   HENT:  Denies nasal congestion or sore throat   Respiratory:  Denies cough or shortness of breath   Cardiovascular:  Denies chest pain or edema   GI:  Denies abdominal pain, nausea, vomiting, bloody stools or diarrhea   :  Denies dysuria, frequency, difficulty in micturition and nocturia  Musculoskeletal:  Denies back pain or joint pain   Neurologic:  Denies headache, focal weakness or sensory changes   Endocrine:  Denies polyuria or polydipsia   Lymphatic:  Denies swollen glands   Psychiatric:  Denies depression or anxiety      Physical Exam:  Physical Exam  PHYSICAL EXAM:  General:  Patient is not in acute distress   Head: Normocephalic, Atraumatic.  HEENT:  Both pupils normal-size atraumatic, normocephalic, nonicteric  Neck:  JVP not raised. Trachea central. No carotid bruit  Respiratory:  normal breath sounds no crackles. no rhonchi  Cardiovascular:  Regular rate and rhythm no S3 no murmurs  GI:  Abdomen soft nontender. No organomegaly.   Lymphatic:  No cervical or inguinal lymphadenopathy  Neurologic:  Patient is awake alert, oriented . Grossly nonfocal  Extremities no edema    Echocardiogram done on 5/24/2025 done at [facility] showed ejection fraction of 30 to 35% with global hypokinesis and grade 1 diastolic dysfunction.    Discussion/Summary:    This patient has new onset cardiomyopathy with ejection fraction of 30 to 35%.  Etiology of cardiomyopathy is unclear.  Lengthy discussion with patient and family regarding possible etiologies  for cardiomyopathy.  Patient does have risk factors for coronary artery disease.  Discussed options of cardiac catheterization to definitively rule out CAD as etiology.    Risks and benefits of cardiac catheterization and possible revascularization options including but not limited to risk of infection, bleeding, risk of myocardial infarction, stroke, and risk of death discussed at length with patient. Patient understands the risks and benefits and wishes to proceed. Cardiac catheterization will be scheduled . Preprocedure workup will be done. Further cardiac evaluation and management after the results of cardiac catheterization.    Patient denies any history of dye allergy.    Other possible etiologies including post-COVID, post other viral cardiomyopathy etc discussed with patient.    Patient has euvolemia on clinical examination.    Will do a trial of low-dose Coreg and see if he is able to tolerate this beta-blocker.  Also started on losartan.    Continue to optimize GDMT.  The rationale for the same discussed with patient and family at length.    Repeat limited echocardiogram to reassess ejection fraction.  If there is no recovery of EF, further discussion regarding prophylactic ICD.    Patient instructed not to return to work which involves heavy lifting till he is reevaluated in September of this year.    Patient and family had a lot of questions which were answered.         [1]   Patient Active Problem List  Diagnosis    Acquired hypothyroidism    Current smoker on some days    Hyponatremia    Lung nodule    COVID    Former smoker    Viral cardiomyopathy (HCC)   [2]   Past Medical History:  Diagnosis Date    Allergic     Disease of thyroid gland     Hernia, inguinal     Torn meniscus     rt knee x 3   [3]   Family History  Problem Relation Name Age of Onset    No Known Problems Mother      Hypertension Father amy bailey     Heart disease Father amy bailey     Diabetes Brother karina leo     Diabetes  Brother cayetano bailey     Lung cancer Paternal Grandfather      No Known Problems Daughter      No Known Problems Son     [4]   Past Surgical History:  Procedure Laterality Date    INGUINAL HERNIA REPAIR Left     Done around age 16    KNEE ARTHROSCOPY Right     Had the surgery twice on his right knee   [5]   Current Outpatient Medications:     levothyroxine 75 mcg tablet, Take 1 tablet by mouth in the morning, Disp: , Rfl:

## 2025-06-17 ENCOUNTER — TELEPHONE (OUTPATIENT)
Dept: CARDIOLOGY CLINIC | Facility: CLINIC | Age: 65
End: 2025-06-17

## 2025-06-17 ENCOUNTER — PREP FOR PROCEDURE (OUTPATIENT)
Dept: CARDIOLOGY CLINIC | Facility: CLINIC | Age: 65
End: 2025-06-17

## 2025-06-17 DIAGNOSIS — I42.9 CARDIOMYOPATHY, UNSPECIFIED TYPE (HCC): Primary | ICD-10-CM

## 2025-06-17 NOTE — TELEPHONE ENCOUNTER
S/w pt. Advised of card cath scheduled for 6/24. Pt advised to hold losartan morning of procedure. Pt obtained labs. Message sent to pt via [employer] with instructions provided over the phone. Pt verbalized understanding

## 2025-06-17 NOTE — TELEPHONE ENCOUNTER
----- Message from Nerissa Cohen MD sent at 6/16/2025  7:05 PM EDT -----  Regarding: Schedule cardiac catheterization  Please schedule this patient for cardiac catheterization at [facility] in the next 1 to 2 weeks.Diagnosis cardiomyopathyPlease let me know if you have any questions.Patient denied any history of dye allergy.

## 2025-06-18 ENCOUNTER — HOSPITAL ENCOUNTER (OUTPATIENT)
Dept: RADIOLOGY | Age: 65
Discharge: HOME | End: 2025-06-18
Attending: INTERNAL MEDICINE
Payer: COMMERCIAL

## 2025-06-18 DIAGNOSIS — R91.1 LUNG NODULE: ICD-10-CM

## 2025-06-18 LAB — GLUCOSE SERPL-MCNC: 117 MG/DL (ref 65–140)

## 2025-06-18 PROCEDURE — 78815 PET IMAGE W/CT SKULL-THIGH: CPT

## 2025-06-18 PROCEDURE — A9552 F18 FDG: HCPCS

## 2025-06-18 PROCEDURE — 82948 REAGENT STRIP/BLOOD GLUCOSE: CPT

## 2025-06-20 ENCOUNTER — TELEPHONE (OUTPATIENT)
Dept: CARDIOLOGY CLINIC | Facility: CLINIC | Age: 65
End: 2025-06-20

## 2025-06-23 ENCOUNTER — PREP FOR PROCEDURE (OUTPATIENT)
Dept: NON INVASIVE DIAGNOSTICS | Facility: HOSPITAL | Age: 65
End: 2025-06-23

## 2025-06-23 RX ORDER — SODIUM CHLORIDE 9 MG/ML
75 INJECTION, SOLUTION INTRAVENOUS CONTINUOUS
Status: CANCELLED | OUTPATIENT
Start: 2025-06-23 | End: 2025-06-23

## 2025-06-24 ENCOUNTER — HOSPITAL ENCOUNTER (OUTPATIENT)
Facility: HOSPITAL | Age: 65
Setting detail: OUTPATIENT SURGERY
Discharge: HOME | End: 2025-06-24
Attending: INTERNAL MEDICINE | Admitting: INTERNAL MEDICINE
Payer: COMMERCIAL

## 2025-06-24 VITALS
OXYGEN SATURATION: 96 % | RESPIRATION RATE: 17 BRPM | HEIGHT: 71 IN | TEMPERATURE: 97.2 F | SYSTOLIC BLOOD PRESSURE: 147 MMHG | HEART RATE: 82 BPM | BODY MASS INDEX: 22.65 KG/M2 | WEIGHT: 161.8 LBS | DIASTOLIC BLOOD PRESSURE: 77 MMHG

## 2025-06-24 DIAGNOSIS — I42.9 CARDIOMYOPATHY, UNSPECIFIED TYPE (HCC): ICD-10-CM

## 2025-06-24 DIAGNOSIS — Z98.890 S/P CARDIAC CATHETERIZATION: Primary | ICD-10-CM

## 2025-06-24 LAB
INR PPP: 0.96 (ref 0.85–1.19)
PROTHROMBIN TIME: 13.5 SECONDS (ref 12.3–15)

## 2025-06-24 PROCEDURE — 99152 MOD SED SAME PHYS/QHP 5/>YRS: CPT | Performed by: INTERNAL MEDICINE

## 2025-06-24 PROCEDURE — 85610 PROTHROMBIN TIME: CPT | Performed by: INTERNAL MEDICINE

## 2025-06-24 PROCEDURE — 93571 IV DOP VEL&/PRESS C FLO 1ST: CPT | Performed by: INTERNAL MEDICINE

## 2025-06-24 PROCEDURE — C1894 INTRO/SHEATH, NON-LASER: HCPCS | Performed by: INTERNAL MEDICINE

## 2025-06-24 PROCEDURE — C1769 GUIDE WIRE: HCPCS | Performed by: INTERNAL MEDICINE

## 2025-06-24 PROCEDURE — 93458 L HRT ARTERY/VENTRICLE ANGIO: CPT | Performed by: INTERNAL MEDICINE

## 2025-06-24 PROCEDURE — 99153 MOD SED SAME PHYS/QHP EA: CPT | Performed by: INTERNAL MEDICINE

## 2025-06-24 PROCEDURE — 76937 US GUIDE VASCULAR ACCESS: CPT | Performed by: INTERNAL MEDICINE

## 2025-06-24 RX ORDER — MIDAZOLAM HYDROCHLORIDE 2 MG/2ML
INJECTION, SOLUTION INTRAMUSCULAR; INTRAVENOUS CODE/TRAUMA/SEDATION MEDICATION
Status: DISCONTINUED | OUTPATIENT
Start: 2025-06-24 | End: 2025-06-24 | Stop reason: HOSPADM

## 2025-06-24 RX ORDER — HEPARIN SODIUM 1000 [USP'U]/ML
INJECTION, SOLUTION INTRAVENOUS; SUBCUTANEOUS CODE/TRAUMA/SEDATION MEDICATION
Status: DISCONTINUED | OUTPATIENT
Start: 2025-06-24 | End: 2025-06-24 | Stop reason: HOSPADM

## 2025-06-24 RX ORDER — SODIUM CHLORIDE 9 MG/ML
75 INJECTION, SOLUTION INTRAVENOUS CONTINUOUS
Status: DISCONTINUED | OUTPATIENT
Start: 2025-06-24 | End: 2025-06-24 | Stop reason: HOSPADM

## 2025-06-24 RX ORDER — LIDOCAINE WITH 8.4% SOD BICARB 0.9%(10ML)
SYRINGE (ML) INJECTION CODE/TRAUMA/SEDATION MEDICATION
Status: DISCONTINUED | OUTPATIENT
Start: 2025-06-24 | End: 2025-06-24 | Stop reason: HOSPADM

## 2025-06-24 RX ORDER — VERAPAMIL HCL 2.5 MG/ML
AMPUL (ML) INTRAVENOUS CODE/TRAUMA/SEDATION MEDICATION
Status: DISCONTINUED | OUTPATIENT
Start: 2025-06-24 | End: 2025-06-24 | Stop reason: HOSPADM

## 2025-06-24 RX ORDER — NITROGLYCERIN 20 MG/100ML
INJECTION INTRAVENOUS CODE/TRAUMA/SEDATION MEDICATION
Status: DISCONTINUED | OUTPATIENT
Start: 2025-06-24 | End: 2025-06-24 | Stop reason: HOSPADM

## 2025-06-24 RX ORDER — FENTANYL CITRATE 50 UG/ML
INJECTION, SOLUTION INTRAMUSCULAR; INTRAVENOUS CODE/TRAUMA/SEDATION MEDICATION
Status: DISCONTINUED | OUTPATIENT
Start: 2025-06-24 | End: 2025-06-24 | Stop reason: HOSPADM

## 2025-06-24 RX ORDER — IODIXANOL 320 MG/ML
INJECTION, SOLUTION INTRAVASCULAR CODE/TRAUMA/SEDATION MEDICATION
Status: DISCONTINUED | OUTPATIENT
Start: 2025-06-24 | End: 2025-06-24 | Stop reason: HOSPADM

## 2025-06-24 RX ORDER — SODIUM CHLORIDE 9 MG/ML
75 INJECTION, SOLUTION INTRAVENOUS CONTINUOUS
Status: DISPENSED | OUTPATIENT
Start: 2025-06-24 | End: 2025-06-24

## 2025-06-24 RX ADMIN — SODIUM CHLORIDE 75 ML/HR: 0.9 INJECTION, SOLUTION INTRAVENOUS at 07:42

## 2025-06-24 NOTE — INTERVAL H&P NOTE
H&P reviewed. After examining the patient I find no changes in the patients condition since the H&P had been written.    Discussed the indications, alternatives, risks and benefit of cardiac catheterization and possible PCI. The procedure risks, benefits, and complications (including but not limited to bleeding, infection, arrhythmia, nephrotoxicity, vessel injury, myocardial infarction, CVA, and death) were reviewed.  Patient is alert and oriented x3 and wishes to proceed. All questions answered.  Denies history of iodinated contrast allergy.      Vitals:    06/24/25 0736   BP: 157/89   Pulse: 80   Resp: 20   Temp: (!) 97.2 °F (36.2 °C)   SpO2: 99%

## 2025-06-27 DIAGNOSIS — I42.9 CARDIOMYOPATHY, UNSPECIFIED TYPE (HCC): Primary | ICD-10-CM

## 2025-06-27 NOTE — TELEPHONE ENCOUNTER
Forms completed and faxed to number provided.  LVM advising pt forms were faxed and placed in front with our clerical staff for .

## 2025-06-27 NOTE — TELEPHONE ENCOUNTER
The form was filled and signed and given to Filomena.    Needs more information before it gets faxed.  Thank you

## 2025-06-30 DIAGNOSIS — E03.9 ACQUIRED HYPOTHYROIDISM: Primary | ICD-10-CM

## 2025-07-01 RX ORDER — LEVOTHYROXINE SODIUM 75 UG/1
75 TABLET ORAL DAILY
Qty: 30 TABLET | Refills: 5 | Status: SHIPPED | OUTPATIENT
Start: 2025-07-01

## 2025-07-01 RX ORDER — LEVOTHYROXINE SODIUM 75 UG/1
75 TABLET ORAL DAILY
Refills: 0 | OUTPATIENT
Start: 2025-07-01

## 2025-07-02 ENCOUNTER — TELEPHONE (OUTPATIENT)
Age: 65
End: 2025-07-02

## 2025-07-02 ENCOUNTER — OFFICE VISIT (OUTPATIENT)
Dept: PULMONOLOGY | Facility: MEDICAL CENTER | Age: 65
End: 2025-07-02
Payer: COMMERCIAL

## 2025-07-02 VITALS
OXYGEN SATURATION: 99 % | RESPIRATION RATE: 12 BRPM | SYSTOLIC BLOOD PRESSURE: 134 MMHG | BODY MASS INDEX: 23.24 KG/M2 | HEART RATE: 75 BPM | TEMPERATURE: 99.5 F | HEIGHT: 71 IN | DIASTOLIC BLOOD PRESSURE: 82 MMHG | WEIGHT: 166 LBS

## 2025-07-02 DIAGNOSIS — R91.1 LUNG NODULE: Primary | ICD-10-CM

## 2025-07-02 DIAGNOSIS — I42.9 CARDIOMYOPATHY, UNSPECIFIED TYPE (HCC): ICD-10-CM

## 2025-07-02 DIAGNOSIS — I25.10 CORONARY ARTERY DISEASE INVOLVING NATIVE CORONARY ARTERY OF NATIVE HEART WITHOUT ANGINA PECTORIS: Primary | ICD-10-CM

## 2025-07-02 DIAGNOSIS — Z87.891 FORMER SMOKER: ICD-10-CM

## 2025-07-02 PROCEDURE — 99214 OFFICE O/P EST MOD 30 MIN: CPT | Performed by: INTERNAL MEDICINE

## 2025-07-02 RX ORDER — ASPIRIN 81 MG/1
81 TABLET, CHEWABLE ORAL DAILY
Start: 2025-07-02

## 2025-07-02 NOTE — TELEPHONE ENCOUNTER
I reviewed the results of cardiac cath.  Patient has 60% stenosis of the right coronary artery not flow-limiting requiring a stent.  Mild to moderate atherosclerosis noted.  Overall medical therapy and no intervention at this time.    If patient is not taking aspirin he should take it.  81 mg p.o. daily.  Continue rest of the medications.    He should have a follow-up echocardiogram as was ordered during last office visit and keep the appointment in September I believe.    Thank you.

## 2025-07-02 NOTE — TELEPHONE ENCOUNTER
Called pt and left vm with call back # requesting a return call in order to relay Dr. PIERCE's message.

## 2025-07-02 NOTE — TELEPHONE ENCOUNTER
Caller: Naida/ Wife     Doctor: Dr. Cohen     Reason for call: pt's wife called advising that her  had a cath done on 6/24 but no instruction on what to do next was advised. I told her that HFU are usually scheduled and could look for availability. She requested a call back first to discuss.    Call back#: [phone]

## 2025-07-05 PROBLEM — I42.9 CARDIOMYOPATHY, UNSPECIFIED (HCC): Status: ACTIVE | Noted: 2025-07-05

## 2025-07-05 NOTE — PROGRESS NOTES
Follow-up  Visit - Pulmonary Medicine   Name: Huseyin Mckay      : 1960      MRN: [id number]  Encounter Provider: Jose Louie DO  Encounter Date: 2025   Encounter department: [facility] [place]  :  Assessment & Plan  Lung nodule  I did review PET CT scan of chest that was done on 2025 with Huseyin and his wife.  This shows slightly elevated 12 mm right upper lobe lung nodule in the right upper lobe.  No nodules or lesions elsewhere.  No hypermetabolic activity elsewhere.    Although there is no hypermetabolic activity in this nodule I told Huseyin that he should have follow-up CT scan of chest to make sure this lesion does not increase in size.  There is always possibility can be a low-grade lung carcinoma.    I did order follow-up CT scan of chest which will be done in late September or early October and discussed results with him when CT scan is done and make further recommendations    Orders:    CT chest without contrast; Future    Cardiomyopathy, unspecified type (HCC)  Huseyin was recently diagnosed with cardiomyopathy she was admitted to hospital with weakness and confusion related to COVID infection.  Echocardiogram done May 24 of this year showed LV systolic function be severely decreased with left ventricular ejection fraction of 30 to 35%    He did have a elective cardiac catheterization done 2025 at [facility] which showed some moderate coronary artery disease but no need for any intervention.  He is on carvedilol 3.125 mg twice daily and losartan 25 mg daily.    He is not having any shortness of breath with his daily activities.         Former smoker  He quit smoking in May.  I did encourage him to remain smoke-free.        Return if symptoms worsen or fail to improve.    History of Present Illness   Huseyin Mckay is a 65 y.o. male who presents for follow-up visit to review results of PET CT scan that was done on 2025 2 up  determine if a spiculated 12 mm nodule in right upper lobe that was detected on 5/23/2025 could be a possible malignancy.  This initial CT scan in May was done on patient presented to [facility] for weakness and confusion related to a COVID infection.  He did have echocardiogram done 5/24/2025 which showed LV systolic function be severely decreased with left ventricular ejection fraction at 30 to 35%.    He smokes cigarettes half a pack of per day from age 20 to age 32 then switched to small cigars which he smoked about 2 or so per day for 30 years.    Her last visit in June showed normal lung volumes.  He did undergo cardiac catheterization on 6/24/2025 at [facility].  This revealed moderate atherosclerosis of the left circumflex artery and moderate stenosis in the first obtuse marginal vessel which was an intermediate caliber vessel.  There is mild atherosclerosis in LAD.  There is moderate elevation left ventricular end-diastolic pressure.    He is still on medical leave from his job.  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  He would like to return to work.  He will discuss this with his cardiologist next visit.    Review of Systems   Constitutional:  Negative for chills, fever and unexpected weight change.   HENT:  Negative for congestion, rhinorrhea and sore throat.    Eyes:  Negative for discharge and redness.   Respiratory:  Negative for cough, shortness of breath and wheezing.    Cardiovascular:  Negative for chest pain, palpitations and leg swelling.   Gastrointestinal:  Negative for abdominal distention, abdominal pain and nausea.   Endocrine: Negative for polydipsia and polyphagia.   Genitourinary:  Negative for dysuria.   Musculoskeletal:  Negative for joint swelling and myalgias.   Skin:  Negative for rash.   Neurological:  Negative for light-headedness.   Psychiatric/Behavioral:  Negative for decreased concentration.        Aside from what is  "mentioned in the HPI, ROS is otherwise negative         Medical History Reviewed by provider this encounter:     .    Objective   /82 (BP Location: Left arm, Patient Position: Sitting, Cuff Size: Standard)   Pulse 75   Temp 99.5 °F (37.5 °C) (Temporal)   Resp 12   Ht 5' 11\" (1.803 m)   Wt 75.3 kg (166 lb)   SpO2 99%   BMI 23.15 kg/m²     Physical Exam  Vitals reviewed.   Constitutional:       General: He is not in acute distress.     Appearance: Normal appearance. He is well-developed.   HENT:      Head: Normocephalic.      Right Ear: External ear normal.      Left Ear: External ear normal.      Nose: Nose normal.      Mouth/Throat:      Mouth: Mucous membranes are moist.      Pharynx: No oropharyngeal exudate.     Eyes:      Conjunctiva/sclera: Conjunctivae normal.      Pupils: Pupils are equal, round, and reactive to light.       Cardiovascular:      Rate and Rhythm: Normal rate and regular rhythm.      Heart sounds: Normal heart sounds.   Pulmonary:      Effort: Pulmonary effort is normal.      Comments: Lung sounds are clear.  No wheezes, crackles or rhonchi  Abdominal:      General: There is no distension.      Palpations: Abdomen is soft.      Tenderness: There is no abdominal tenderness.     Musculoskeletal:      Cervical back: Neck supple.      Comments: No edema, cyanosis or clubbing     Skin:     General: Skin is warm and dry.     Neurological:      General: No focal deficit present.      Mental Status: He is alert and oriented to person, place, and time.     Psychiatric:         Mood and Affect: Mood normal.         Behavior: Behavior normal.         Thought Content: Thought content normal.             Radiology results:  Radiology Results Review: I personally reviewed the following image studies in PACS and associated radiology reports: 6/18/2025 PET CT scan of chest. My interpretation of the radiology images/reports is: As follows.    PET CT scan did show a 12 mm slightly spiculated nodule " in the right upper lobe but there was no focal uptake.  No other lesions or hypermetabolic activity elsewhere.          Jose Louie,

## 2025-07-05 NOTE — ASSESSMENT & PLAN NOTE
Huseyin was recently diagnosed with cardiomyopathy she was admitted to hospital with weakness and confusion related to COVID infection.  Echocardiogram done May 24 of this year showed LV systolic function be severely decreased with left ventricular ejection fraction of 30 to 35%    He did have a elective cardiac catheterization done 6/24/2025 at [facility] which showed some moderate coronary artery disease but no need for any intervention.  He is on carvedilol 3.125 mg twice daily and losartan 25 mg daily.    He is not having any shortness of breath with his daily activities.

## 2025-07-05 NOTE — ASSESSMENT & PLAN NOTE
I did review PET CT scan of chest that was done on 6/16/2025 with Huseyin and his wife.  This shows slightly elevated 12 mm right upper lobe lung nodule in the right upper lobe.  No nodules or lesions elsewhere.  No hypermetabolic activity elsewhere.    Although there is no hypermetabolic activity in this nodule I told Huseyin that he should have follow-up CT scan of chest to make sure this lesion does not increase in size.  There is always possibility can be a low-grade lung carcinoma.    I did order follow-up CT scan of chest which will be done in late September or early October and discussed results with him when CT scan is done and make further recommendations    Orders:    CT chest without contrast; Future

## 2025-07-09 NOTE — TELEPHONE ENCOUNTER
Called and spoke with Naida. Relayed Dr. PIERCE's feedback and reminded her that pt needs his echo completed before coming in for his appt with Filomena on 9/10. She verbally understood.

## 2025-07-09 NOTE — TELEPHONE ENCOUNTER
Patient's spouse Naida [phone] contacting [facility] on behalf of established patient of Dr. Cohen returning telephone call to discuss cardiac cath results.

## 2025-07-10 DIAGNOSIS — E87.1 HYPONATREMIA: Primary | ICD-10-CM

## 2025-07-14 ENCOUNTER — TELEPHONE (OUTPATIENT)
Age: 65
End: 2025-07-14

## 2025-07-14 NOTE — TELEPHONE ENCOUNTER
I called and spoke with Huseyin' wife regarding him completing blood/urine lab work prior to upcoming CONSULT appt on 07/21/2025

## 2025-08-12 ENCOUNTER — TELEPHONE (OUTPATIENT)
Dept: CARDIOLOGY CLINIC | Facility: CLINIC | Age: 65
End: 2025-08-12

## 2025-08-12 ENCOUNTER — TELEPHONE (OUTPATIENT)
Dept: OTHER | Facility: OTHER | Age: 65
End: 2025-08-12

## 2025-08-20 DIAGNOSIS — E03.9 ACQUIRED HYPOTHYROIDISM: ICD-10-CM

## 2025-08-21 RX ORDER — LEVOTHYROXINE SODIUM 75 UG/1
75 TABLET ORAL DAILY
Qty: 90 TABLET | Refills: 1 | Status: SHIPPED | OUTPATIENT
Start: 2025-08-21

## (undated) DEVICE — PRESSURE GUIDEWIRE: Brand: COMET™ II

## (undated) DEVICE — RADIFOCUS OPTITORQUE ANGIOGRAPHIC CATHETER: Brand: OPTITORQUE

## (undated) DEVICE — DGW .035 FC J3MM 260CM TEF: Brand: EMERALD

## (undated) DEVICE — TR BAND RADIAL ARTERY COMPRESSION DEVICE: Brand: TR BAND

## (undated) DEVICE — GLIDESHEATH SLENDER STAINLESS STEEL KIT: Brand: GLIDESHEATH SLENDER